# Patient Record
Sex: MALE | Race: WHITE | Employment: OTHER | URBAN - METROPOLITAN AREA
[De-identification: names, ages, dates, MRNs, and addresses within clinical notes are randomized per-mention and may not be internally consistent; named-entity substitution may affect disease eponyms.]

---

## 2018-09-20 ENCOUNTER — TELEPHONE (OUTPATIENT)
Dept: SURGERY | Facility: CLINIC | Age: 63
End: 2018-09-20

## 2018-09-20 ENCOUNTER — HOSPITAL ENCOUNTER (OUTPATIENT)
Dept: CT IMAGING | Facility: HOSPITAL | Age: 63
Discharge: HOME | End: 2018-09-20
Attending: FAMILY MEDICINE
Payer: COMMERCIAL

## 2018-09-20 DIAGNOSIS — I77.9 DISEASE OF ARTERY (HCC): ICD-10-CM

## 2018-09-20 DIAGNOSIS — R93.1 ABNORMAL ECHOCARDIOGRAM: ICD-10-CM

## 2018-09-20 DIAGNOSIS — R07.1 CHEST PAIN MADE WORSE BY BREATHING: ICD-10-CM

## 2018-09-20 DIAGNOSIS — R07.9 CHEST PAIN, UNSPECIFIED: ICD-10-CM

## 2018-09-20 PROCEDURE — 71275 CT ANGIOGRAPHY CHEST: CPT | Performed by: FAMILY MEDICINE

## 2018-09-20 PROCEDURE — 82565 ASSAY OF CREATININE: CPT

## 2018-09-20 NOTE — TELEPHONE ENCOUNTER
Pt scheduled NP appt 9/25 with Dr Judson Fu by Dr Steve Carpenter for back pain,NP paperwork/Referral endorsed to Pain JEB diazer

## 2018-09-25 ENCOUNTER — OFFICE VISIT (OUTPATIENT)
Dept: PAIN CLINIC | Facility: CLINIC | Age: 63
End: 2018-09-25
Payer: COMMERCIAL

## 2018-09-25 VITALS
OXYGEN SATURATION: 97 % | HEART RATE: 65 BPM | DIASTOLIC BLOOD PRESSURE: 84 MMHG | BODY MASS INDEX: 39.1 KG/M2 | WEIGHT: 295 LBS | SYSTOLIC BLOOD PRESSURE: 112 MMHG | HEIGHT: 73 IN

## 2018-09-25 DIAGNOSIS — M54.6 CHRONIC RIGHT-SIDED THORACIC BACK PAIN: ICD-10-CM

## 2018-09-25 DIAGNOSIS — G89.29 CHRONIC RIGHT-SIDED THORACIC BACK PAIN: ICD-10-CM

## 2018-09-25 DIAGNOSIS — M54.6 THORACIC SPINE PAIN: Primary | ICD-10-CM

## 2018-09-25 LAB — CREAT SERPL-MCNC: 0.6 MG/DL (ref 0.7–1.3)

## 2018-09-25 PROCEDURE — 99203 OFFICE O/P NEW LOW 30 MIN: CPT | Performed by: ANESTHESIOLOGY

## 2018-09-25 RX ORDER — IBUPROFEN 200 MG
600 TABLET ORAL EVERY 6 HOURS PRN
COMMUNITY
End: 2022-01-31

## 2018-09-25 RX ORDER — PHENTERMINE HYDROCHLORIDE 15 MG/1
15 CAPSULE ORAL DAILY
Refills: 1 | COMMUNITY
Start: 2018-09-11 | End: 2019-01-17

## 2018-09-25 RX ORDER — TOPIRAMATE 50 MG/1
50 TABLET, FILM COATED ORAL DAILY
Refills: 0 | COMMUNITY
Start: 2018-09-11 | End: 2020-09-16

## 2018-09-25 RX ORDER — FUROSEMIDE 20 MG/1
20 TABLET ORAL DAILY
Refills: 0 | COMMUNITY
Start: 2018-07-10

## 2018-09-25 RX ORDER — LEVOTHYROXINE SODIUM 0.15 MG/1
150 TABLET ORAL DAILY
Refills: 0 | COMMUNITY
Start: 2018-09-09

## 2018-09-25 NOTE — PATIENT INSTRUCTIONS
Refill policies:    • Allow 2-3 business days for refills; controlled substances may take longer.   • Contact your pharmacy at least 5 days prior to running out of medication and have them send an electronic request or submit request through the “request re entire amount billed. Precertification and Prior Authorizations: If your physician has recommended that you have a procedure or additional testing performed.   [facility]) will contact your insurance carrier to obtain pre-certi

## 2018-09-25 NOTE — H&P
Name: Yariel Gonzalez   : 1955   DOS: 2018     Chief complaint: Right-sided thoracic spine pain    History of present illness:  Yariel Gonzalez is a 60year old male complaining of a 4 month history of right-sided thoracic spine pain.   Jose Victoria 295 lb   SpO2 97%   BMI 38.92 kg/m²    The patient is awake, alert, oriented and corporative. She has a normal affect. The patient ambulates with normal gait. HEENT: No gross lesion noted. PEERL. No icterus. Neck and Upper Extremity: Supple.  No thyromega Management

## 2018-09-25 NOTE — PROGRESS NOTES
HPI    Review of Systems      Physical Exam   Constitutional:         IF#[id number]  NP here c/o Right sided mid back pain 2/10 current pain. Pain increases upon turning/twisting. With wife, ok to hear PHI.     Location of Pain: Right sided mid-back pain    Da

## 2018-10-11 ENCOUNTER — HOSPITAL ENCOUNTER (OUTPATIENT)
Dept: CV DIAGNOSTICS | Facility: HOSPITAL | Age: 63
Discharge: HOME | End: 2018-10-11
Attending: FAMILY MEDICINE
Payer: COMMERCIAL

## 2018-10-11 DIAGNOSIS — I71.2 ANEURYSM, AORTA, THORACIC (HCC): ICD-10-CM

## 2018-10-11 PROCEDURE — 93306 TTE W/DOPPLER COMPLETE: CPT | Performed by: FAMILY MEDICINE

## 2018-11-20 ENCOUNTER — TELEPHONE (OUTPATIENT)
Dept: SURGERY | Facility: CLINIC | Age: 63
End: 2018-11-20

## 2018-11-20 NOTE — TELEPHONE ENCOUNTER
rcvd MRI Thoracic 11/20/18 CD from Patient. They waited for it to be uploaded in PACS & took it home. [employer] imaging.  No report

## 2018-12-04 ENCOUNTER — OFFICE VISIT (OUTPATIENT)
Dept: PAIN CLINIC | Facility: CLINIC | Age: 63
End: 2018-12-04
Payer: COMMERCIAL

## 2018-12-04 VITALS
HEART RATE: 57 BPM | DIASTOLIC BLOOD PRESSURE: 80 MMHG | HEIGHT: 73 IN | OXYGEN SATURATION: 98 % | SYSTOLIC BLOOD PRESSURE: 128 MMHG | BODY MASS INDEX: 39.36 KG/M2 | WEIGHT: 297 LBS

## 2018-12-04 DIAGNOSIS — M54.6 CHRONIC RIGHT-SIDED THORACIC BACK PAIN: Primary | ICD-10-CM

## 2018-12-04 DIAGNOSIS — G89.29 CHRONIC RIGHT-SIDED THORACIC BACK PAIN: Primary | ICD-10-CM

## 2018-12-04 PROCEDURE — 99213 OFFICE O/P EST LOW 20 MIN: CPT | Performed by: ANESTHESIOLOGY

## 2018-12-04 NOTE — PROGRESS NOTES
Name: Felicia Quintana   : 1955   DOS: 2018     Pain Clinic Follow Up Visit:   Patient presents with: Follow - Up: right side/mid back.  2/10  Other: with alpesh Rocha to hear Kristy Flaco is a 60year old male with a chronic a ri the right scapular border. Full range of motion. IMAGES:   Cervical and thoracic MRI reviewed. He does have a known abdominal aortic aneurysm that is 4.1 cm.   He is followed with his cardiologist.  Additionally, from a musculoskeletal standpoint, th

## 2018-12-06 ENCOUNTER — TELEPHONE (OUTPATIENT)
Dept: PAIN CLINIC | Facility: CLINIC | Age: 63
End: 2018-12-06

## 2018-12-06 NOTE — TELEPHONE ENCOUNTER
Spoke to [place] at [address], [employer] ([id number]) is valid and billable, no copay applies to the service, covered at 60% of the allowed amount. No prior authorization or predetermination required.  Call reference #[id number], time on c

## 2018-12-07 ENCOUNTER — TELEPHONE (OUTPATIENT)
Dept: PAIN CLINIC | Facility: CLINIC | Age: 63
End: 2018-12-07

## 2018-12-07 ENCOUNTER — OFFICE VISIT (OUTPATIENT)
Dept: PAIN CLINIC | Facility: CLINIC | Age: 63
End: 2018-12-07
Payer: COMMERCIAL

## 2018-12-07 VITALS
DIASTOLIC BLOOD PRESSURE: 88 MMHG | WEIGHT: 297 LBS | OXYGEN SATURATION: 93 % | BODY MASS INDEX: 39.36 KG/M2 | HEART RATE: 70 BPM | HEIGHT: 73 IN | SYSTOLIC BLOOD PRESSURE: 122 MMHG

## 2018-12-07 DIAGNOSIS — G89.29 CHRONIC RIGHT-SIDED THORACIC BACK PAIN: Primary | ICD-10-CM

## 2018-12-07 DIAGNOSIS — M54.6 CHRONIC RIGHT-SIDED THORACIC BACK PAIN: Primary | ICD-10-CM

## 2018-12-07 PROCEDURE — 99211 OFF/OP EST MAY X REQ PHY/QHP: CPT | Performed by: ANESTHESIOLOGY

## 2018-12-07 NOTE — PROGRESS NOTES
Patient initially scheduled for right scapular border trigger point injections. During discussion prior to injection, the patient states that he does not have any scapular pain today. Therefore, decision was made to cancel injection.   He does have pain a

## 2018-12-07 NOTE — TELEPHONE ENCOUNTER
Medical clearance needed- no    Pt seen in OV today by Dr. Mia Lynch and recommended for Intercostal Nerve Block (X 1). Please begin PA process for procedure(s). Laterality: right  Level(s):  Intercostal Nerve Block    Pt informed callback will be given whe

## 2018-12-07 NOTE — TELEPHONE ENCOUNTER
Spoke with Asim from [address] [place] [address] Ave [phone] Intercostal Nerve Block PA needed   Faxed clinicals to [phone]  Pending case [id number]  Call time 1:00:47

## 2018-12-14 ENCOUNTER — TELEPHONE (OUTPATIENT)
Dept: SURGERY | Facility: CLINIC | Age: 63
End: 2018-12-14

## 2018-12-20 NOTE — TELEPHONE ENCOUNTER
Spoke to Manoj at [facility], codes [facility] are valid and billable, no prior authorization or predetermination required.    Call reference: [id number]  Call time 35:29    Patient can now be scheduled

## 2018-12-26 ENCOUNTER — APPOINTMENT (OUTPATIENT)
Dept: GENERAL RADIOLOGY | Facility: HOSPITAL | Age: 63
End: 2018-12-26
Attending: ANESTHESIOLOGY
Payer: COMMERCIAL

## 2018-12-26 ENCOUNTER — HOSPITAL ENCOUNTER (OUTPATIENT)
Facility: HOSPITAL | Age: 63
Setting detail: HOSPITAL OUTPATIENT SURGERY
Discharge: HOME | End: 2018-12-26
Attending: ANESTHESIOLOGY | Admitting: ANESTHESIOLOGY
Payer: COMMERCIAL

## 2018-12-26 VITALS
SYSTOLIC BLOOD PRESSURE: 132 MMHG | HEART RATE: 68 BPM | RESPIRATION RATE: 20 BRPM | DIASTOLIC BLOOD PRESSURE: 80 MMHG | TEMPERATURE: 97 F | OXYGEN SATURATION: 98 %

## 2018-12-26 DIAGNOSIS — G89.29 CHRONIC RIGHT-SIDED THORACIC BACK PAIN: ICD-10-CM

## 2018-12-26 DIAGNOSIS — M54.6 CHRONIC RIGHT-SIDED THORACIC BACK PAIN: ICD-10-CM

## 2018-12-26 PROCEDURE — 3E0T3BZ INTRODUCTION OF ANESTHETIC AGENT INTO PERIPHERAL NERVES AND PLEXI, PERCUTANEOUS APPROACH: ICD-10-PCS | Performed by: ANESTHESIOLOGY

## 2018-12-26 PROCEDURE — 3E0T33Z INTRODUCTION OF ANTI-INFLAMMATORY INTO PERIPHERAL NERVES AND PLEXI, PERCUTANEOUS APPROACH: ICD-10-PCS | Performed by: ANESTHESIOLOGY

## 2018-12-26 RX ORDER — LIDOCAINE HYDROCHLORIDE 10 MG/ML
INJECTION, SOLUTION INFILTRATION; PERINEURAL AS NEEDED
Status: DISCONTINUED | OUTPATIENT
Start: 2018-12-26 | End: 2018-12-26 | Stop reason: HOSPADM

## 2018-12-26 RX ORDER — ONDANSETRON 2 MG/ML
4 INJECTION INTRAMUSCULAR; INTRAVENOUS ONCE AS NEEDED
Status: DISCONTINUED | OUTPATIENT
Start: 2018-12-26 | End: 2018-12-26

## 2018-12-26 RX ORDER — ONDANSETRON 2 MG/ML
4 INJECTION INTRAMUSCULAR; INTRAVENOUS ONCE AS NEEDED
Status: DISCONTINUED | OUTPATIENT
Start: 2018-12-26 | End: 2018-12-26 | Stop reason: HOSPADM

## 2018-12-26 RX ORDER — METHYLPREDNISOLONE ACETATE 40 MG/ML
INJECTION, SUSPENSION INTRA-ARTICULAR; INTRALESIONAL; INTRAMUSCULAR; SOFT TISSUE AS NEEDED
Status: DISCONTINUED | OUTPATIENT
Start: 2018-12-26 | End: 2018-12-26 | Stop reason: HOSPADM

## 2018-12-26 RX ORDER — SODIUM CHLORIDE, SODIUM LACTATE, POTASSIUM CHLORIDE, CALCIUM CHLORIDE 600; 310; 30; 20 MG/100ML; MG/100ML; MG/100ML; MG/100ML
100 INJECTION, SOLUTION INTRAVENOUS CONTINUOUS
Status: DISCONTINUED | OUTPATIENT
Start: 2018-12-26 | End: 2018-12-26

## 2018-12-26 RX ORDER — DIPHENHYDRAMINE HYDROCHLORIDE 50 MG/ML
50 INJECTION INTRAMUSCULAR; INTRAVENOUS ONCE AS NEEDED
Status: DISCONTINUED | OUTPATIENT
Start: 2018-12-26 | End: 2018-12-26

## 2018-12-26 NOTE — H&P
History & Physical Examination    Patient Name: Cassidy Pedroza  MRN: [id number]  CSN: [id number]  YOB: 1955    Pre-Operative Diagnosis:  Chronic right-sided thoracic back pain [M54.6, G89.29]    Present Illness: Patient with right-sided th [x ]    UROGENITAL [x ] [x ]    EXTREMITIES [x ] [x ]    OTHER        [ x ] I have discussed the risks and benefits and alternatives with the patient/family. They understand and agree to proceed with plan of care.   [ x ] I have reviewed the History and Ph

## 2019-05-21 ENCOUNTER — OFFICE VISIT (OUTPATIENT)
Dept: PAIN CLINIC | Facility: CLINIC | Age: 64
End: 2019-05-21
Payer: COMMERCIAL

## 2019-05-21 ENCOUNTER — TELEPHONE (OUTPATIENT)
Dept: PAIN CLINIC | Facility: CLINIC | Age: 64
End: 2019-05-21

## 2019-05-21 VITALS
HEIGHT: 73 IN | SYSTOLIC BLOOD PRESSURE: 110 MMHG | WEIGHT: 298 LBS | BODY MASS INDEX: 39.49 KG/M2 | HEART RATE: 69 BPM | DIASTOLIC BLOOD PRESSURE: 80 MMHG | OXYGEN SATURATION: 98 %

## 2019-05-21 DIAGNOSIS — G89.29 CHRONIC RIGHT-SIDED THORACIC BACK PAIN: Primary | ICD-10-CM

## 2019-05-21 DIAGNOSIS — M54.6 CHRONIC RIGHT-SIDED THORACIC BACK PAIN: Primary | ICD-10-CM

## 2019-05-21 PROCEDURE — 99213 OFFICE O/P EST LOW 20 MIN: CPT | Performed by: ANESTHESIOLOGY

## 2019-05-21 RX ORDER — PHENTERMINE HYDROCHLORIDE 15 MG/1
CAPSULE ORAL
Refills: 0 | COMMUNITY
Start: 2019-05-10 | End: 2022-01-31

## 2019-05-21 RX ORDER — ROSUVASTATIN CALCIUM 10 MG/1
TABLET, COATED ORAL
Refills: 2 | COMMUNITY
Start: 2019-01-14

## 2019-05-21 NOTE — PROGRESS NOTES
Patient here c/o f/u post RIGHT INTERCOSTAL NERVE BLOCK, MULTIPLE LEVELS injection.     Pain location =     Pain Level = /10    Relief reported = %

## 2019-05-21 NOTE — PROGRESS NOTES
Name: Lolly Warner   : 1955   DOS: 2019     Pain Clinic Follow Up Visit:   Patient presents with: Follow - Up: post intercostal block- 95% pain relief for 1 month. Pain is now back.       Lolly Warner is a 58year old male with a hi patient is a very pleasant 26-year-old gentleman with a history of right-sided back and chest wall pain. He did very well following intercostal nerve blocks. I recommended repeating this injection for him.   He is in agreement and would like to move forwa

## 2019-05-21 NOTE — TELEPHONE ENCOUNTER
Medical clearance needed- no    **Patient reported 95% relief from previous injection that lasted 1 month, currently 50%**    Pt seen in OV today by Dr. Eve Seals and recommended for intercostal nerve block (X 1). Please begin PA process for procedure(s).

## 2019-05-21 NOTE — TELEPHONE ENCOUNTER
Spoke with Jay Jay Smith from [facility] no pa needed for [phone]   Call reference number [id number]  Call time 20:38

## 2019-05-22 NOTE — TELEPHONE ENCOUNTER
Patient called and scheduled procedure. Instructions below reviewed including NSAID hold.  Patient verbalized understanding    [address]  PRE-PROCEDURE INSTRUCTIONS WITHOUT SEDATION    Appointment Date: 6/3/19  Check-In Time: 9:30 am ? 81mg 24 hours  ? Greater than 81 mg (325mg) 7 days  ? Coumadin       5 days   · Procedure may be cancelled if INR is elevated. ? Excedrin (with aspirin) 7 days  ? Plavix (Clopidogrel)  ? Epidural ____ - 10 days  ? Others 7 days   NSAIDs: 24 hours    ?  I ** TO AVOID CANCELLATION AND/OR RESCHEDULING: PLEASE CALL [facility]AL PRE-PROCEDURE LINE [phone] FOR DETAILED INSTRUCTIONS FIVE TO SEVEN DAYS PRIOR TO PROCEDURE**

## 2019-05-22 NOTE — TELEPHONE ENCOUNTER
Patient called and was provided with next available date. He stated that he will need to check with his wife and will call back.

## 2019-05-29 ENCOUNTER — TELEPHONE (OUTPATIENT)
Dept: SURGERY | Facility: CLINIC | Age: 64
End: 2019-05-29

## 2019-06-03 ENCOUNTER — HOSPITAL ENCOUNTER (OUTPATIENT)
Facility: HOSPITAL | Age: 64
Setting detail: HOSPITAL OUTPATIENT SURGERY
Discharge: HOME | End: 2019-06-03
Attending: ANESTHESIOLOGY | Admitting: ANESTHESIOLOGY
Payer: COMMERCIAL

## 2019-06-03 ENCOUNTER — APPOINTMENT (OUTPATIENT)
Dept: GENERAL RADIOLOGY | Facility: HOSPITAL | Age: 64
End: 2019-06-03
Attending: ANESTHESIOLOGY
Payer: COMMERCIAL

## 2019-06-03 VITALS
HEART RATE: 65 BPM | TEMPERATURE: 98 F | OXYGEN SATURATION: 95 % | SYSTOLIC BLOOD PRESSURE: 106 MMHG | DIASTOLIC BLOOD PRESSURE: 56 MMHG | RESPIRATION RATE: 18 BRPM

## 2019-06-03 DIAGNOSIS — G89.29 CHRONIC RIGHT-SIDED THORACIC BACK PAIN: ICD-10-CM

## 2019-06-03 DIAGNOSIS — M54.6 CHRONIC RIGHT-SIDED THORACIC BACK PAIN: ICD-10-CM

## 2019-06-03 PROCEDURE — 3E0T33Z INTRODUCTION OF ANTI-INFLAMMATORY INTO PERIPHERAL NERVES AND PLEXI, PERCUTANEOUS APPROACH: ICD-10-PCS | Performed by: ANESTHESIOLOGY

## 2019-06-03 RX ORDER — LIDOCAINE HYDROCHLORIDE 10 MG/ML
INJECTION, SOLUTION INFILTRATION; PERINEURAL AS NEEDED
Status: DISCONTINUED | OUTPATIENT
Start: 2019-06-03 | End: 2019-06-03

## 2019-06-03 RX ORDER — BUPIVACAINE HYDROCHLORIDE 5 MG/ML
INJECTION, SOLUTION EPIDURAL; INTRACAUDAL AS NEEDED
Status: DISCONTINUED | OUTPATIENT
Start: 2019-06-03 | End: 2019-06-03

## 2019-06-03 RX ORDER — METHYLPREDNISOLONE ACETATE 40 MG/ML
INJECTION, SUSPENSION INTRA-ARTICULAR; INTRALESIONAL; INTRAMUSCULAR; SOFT TISSUE AS NEEDED
Status: DISCONTINUED | OUTPATIENT
Start: 2019-06-03 | End: 2019-06-03

## 2019-06-03 RX ORDER — ONDANSETRON 2 MG/ML
4 INJECTION INTRAMUSCULAR; INTRAVENOUS ONCE AS NEEDED
Status: DISCONTINUED | OUTPATIENT
Start: 2019-06-03 | End: 2019-06-03

## 2019-06-03 RX ORDER — DIPHENHYDRAMINE HYDROCHLORIDE 50 MG/ML
50 INJECTION INTRAMUSCULAR; INTRAVENOUS ONCE AS NEEDED
Status: DISCONTINUED | OUTPATIENT
Start: 2019-06-03 | End: 2019-06-03

## 2019-06-03 NOTE — H&P
History & Physical Examination    Patient Name: Luke Mendez  MRN: [id number]  CSN: [id number]  YOB: 1955    Pre-Operative Diagnosis:  Chronic right-sided thoracic back pain [M54.6, G89.29]    Present Illness: Patient with right-sided th days.  Any changes noted above.     Юлия Alicia MD

## 2019-06-03 NOTE — OPERATIVE REPORT
[facility]  Operative Report  6/3/2019     Jhony Ramos Patient Status:  Hospital Outpatient Surgery    1955 MRN [id number]   [facility] ENDOSCOPY Attending Kwabena Moser MD   Hosp Day # 0 PCP Alo Henson MD     Indicat 0.5 mL lidocaine. The patient tolerated procedure very well. The patient was observed until discharge criteria met. Discharge instructions were given and patient was released to a responsible adult. Complications: None. Follow up:  The patient was

## 2019-12-03 ENCOUNTER — TELEPHONE (OUTPATIENT)
Dept: PAIN CLINIC | Facility: CLINIC | Age: 64
End: 2019-12-03

## 2019-12-03 DIAGNOSIS — G58.8 INTERCOSTAL NEURALGIA: Primary | ICD-10-CM

## 2019-12-03 NOTE — TELEPHONE ENCOUNTER
Pt want s to schedule inj. We advised we haven't seen him in 6mth & to celeste a f/u. Pt states does not want f/u & just inj.  Please call

## 2019-12-04 NOTE — TELEPHONE ENCOUNTER
Spoke to pt to advise that injections would need to be PA'd through ins and OV notes are required. Pt advised that he has recently switched to Medicare Part B and he thought this plan covered all procedures.  Advised pt that Part B does typically cover svcs

## 2019-12-05 NOTE — TELEPHONE ENCOUNTER
Regardless go insurance patient was last seen in office in May of 2019 and we need to re-evaluate and put a plan in place for injection. Please check with Dr. Sandro Gonsalves next week if he wants to schedule an office visit and injection on same day.

## 2019-12-10 NOTE — TELEPHONE ENCOUNTER
Spoke to pt to schedule OV w/ Dr Tamela Alvarenga. Pt states he began Medicare Part B on 12/1/19 and asked if deductibles start over. Advised pt it is my understanding that they do. Scheduled for 12/17/19. No further needs.

## 2019-12-17 ENCOUNTER — OFFICE VISIT (OUTPATIENT)
Dept: PAIN CLINIC | Facility: CLINIC | Age: 64
End: 2019-12-17
Payer: MEDICARE

## 2019-12-17 VITALS
SYSTOLIC BLOOD PRESSURE: 112 MMHG | HEART RATE: 66 BPM | BODY MASS INDEX: 40.42 KG/M2 | DIASTOLIC BLOOD PRESSURE: 72 MMHG | WEIGHT: 305 LBS | OXYGEN SATURATION: 94 % | HEIGHT: 73 IN

## 2019-12-17 DIAGNOSIS — G89.29 CHRONIC RIGHT-SIDED THORACIC BACK PAIN: Primary | ICD-10-CM

## 2019-12-17 DIAGNOSIS — M54.6 CHRONIC RIGHT-SIDED THORACIC BACK PAIN: Primary | ICD-10-CM

## 2019-12-17 PROCEDURE — 99213 OFFICE O/P EST LOW 20 MIN: CPT | Performed by: ANESTHESIOLOGY

## 2019-12-17 RX ORDER — DEXMETHYLPHENIDATE HYDROCHLORIDE 5 MG/1
TABLET ORAL
Refills: 0 | COMMUNITY
Start: 2019-09-19 | End: 2020-02-05 | Stop reason: ALTCHOICE

## 2019-12-17 RX ORDER — CEPHALEXIN 500 MG/1
CAPSULE ORAL
Refills: 0 | COMMUNITY
Start: 2019-10-24 | End: 2020-02-05 | Stop reason: ALTCHOICE

## 2019-12-17 NOTE — PROGRESS NOTES
Patient presents in office today with reported pain in right back, radiating across the entire back. Patient states he has been going to yoga however it seems to make it worse. Current pain level reported = 0/10 (9-10/10 when at its worst).     Last proc

## 2019-12-17 NOTE — TELEPHONE ENCOUNTER
Patient requesting to repeat Intercostal NB with local sedation, requesting to have procedure in January. Procedure scheduled, pre-procedure instructions reviewed. Patient prefers local sedation.  Patient advised to hold ibuprofen for 24 hours prior to proc ? Imbruvica (Ibrutinib) 3 days   ? Enbrel (Etanercept) 24 hours   ? Fragmin (Dalteparin) 24 hours   ? Pletal (Cilostazol) 7 days  ? Effient (Prasugrel) 7 days  ? Pradaxa 10 days  ? Trental 7 days  ? Eliquis (Apixaban) 3 days  ?  Xarelto (Rivaroxaban) 3 days If you are a diabetic, please increase the frequency of your glucose monitoring after the procedure as this may cause a temporary increase in your blood sugar.   Contact your primary care physician if your blood sugar rises as you may require some medicatio

## 2019-12-17 NOTE — PROGRESS NOTES
Name: Jm Toussaint   : 1955   DOS: 2019     Pain Clinic Follow Up Visit:   Patient presents with: Follow - Up      Jm Toussaint is a 58year old male with a history of right-sided intercostal nerve pain, here for follow-up.   The p imaging from procedure reviewed    ASSESSMENT AND PLAN:   Chronic right-sided thoracic back pain  (primary encounter diagnosis)     The patient is a 76-year-old gentleman with right-sided intercostal nerve pain here for follow-up.   He is doing very well fo

## 2019-12-30 ENCOUNTER — TELEPHONE (OUTPATIENT)
Dept: PAIN CLINIC | Facility: CLINIC | Age: 64
End: 2019-12-30

## 2019-12-30 NOTE — TELEPHONE ENCOUNTER
Left message for patient, confirmed procedure date of 1/6/20 and to be checked in at outpatient registration at 1045 am. Patient called pre-procedure line and understood instructions/Patient instructed to call pre-procedure line before procedure at [phone]

## 2020-01-06 ENCOUNTER — APPOINTMENT (OUTPATIENT)
Dept: GENERAL RADIOLOGY | Facility: HOSPITAL | Age: 65
End: 2020-01-06
Attending: ANESTHESIOLOGY
Payer: MEDICARE

## 2020-01-06 ENCOUNTER — HOSPITAL ENCOUNTER (OUTPATIENT)
Facility: HOSPITAL | Age: 65
Setting detail: HOSPITAL OUTPATIENT SURGERY
Discharge: HOME | End: 2020-01-06
Attending: ANESTHESIOLOGY | Admitting: ANESTHESIOLOGY
Payer: MEDICARE

## 2020-01-06 VITALS
OXYGEN SATURATION: 97 % | RESPIRATION RATE: 18 BRPM | TEMPERATURE: 98 F | HEART RATE: 50 BPM | SYSTOLIC BLOOD PRESSURE: 119 MMHG | DIASTOLIC BLOOD PRESSURE: 74 MMHG

## 2020-01-06 DIAGNOSIS — G58.8 INTERCOSTAL NEURALGIA: ICD-10-CM

## 2020-01-06 PROCEDURE — 3E0T3BZ INTRODUCTION OF ANESTHETIC AGENT INTO PERIPHERAL NERVES AND PLEXI, PERCUTANEOUS APPROACH: ICD-10-PCS | Performed by: ANESTHESIOLOGY

## 2020-01-06 PROCEDURE — 3E0T33Z INTRODUCTION OF ANTI-INFLAMMATORY INTO PERIPHERAL NERVES AND PLEXI, PERCUTANEOUS APPROACH: ICD-10-PCS | Performed by: ANESTHESIOLOGY

## 2020-01-06 RX ORDER — BUPIVACAINE HYDROCHLORIDE 5 MG/ML
INJECTION, SOLUTION EPIDURAL; INTRACAUDAL AS NEEDED
Status: DISCONTINUED | OUTPATIENT
Start: 2020-01-06 | End: 2020-01-06

## 2020-01-06 RX ORDER — ONDANSETRON 2 MG/ML
4 INJECTION INTRAMUSCULAR; INTRAVENOUS ONCE AS NEEDED
Status: CANCELLED | OUTPATIENT
Start: 2020-01-06 | End: 2020-01-06

## 2020-01-06 RX ORDER — LIDOCAINE HYDROCHLORIDE 10 MG/ML
INJECTION, SOLUTION INFILTRATION; PERINEURAL AS NEEDED
Status: DISCONTINUED | OUTPATIENT
Start: 2020-01-06 | End: 2020-01-06

## 2020-01-06 RX ORDER — METHYLPREDNISOLONE ACETATE 40 MG/ML
INJECTION, SUSPENSION INTRA-ARTICULAR; INTRALESIONAL; INTRAMUSCULAR; SOFT TISSUE AS NEEDED
Status: DISCONTINUED | OUTPATIENT
Start: 2020-01-06 | End: 2020-01-06

## 2020-01-06 RX ORDER — DIPHENHYDRAMINE HYDROCHLORIDE 50 MG/ML
50 INJECTION INTRAMUSCULAR; INTRAVENOUS ONCE AS NEEDED
Status: CANCELLED | OUTPATIENT
Start: 2020-01-06 | End: 2020-01-06

## 2020-01-06 NOTE — H&P
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 They understand and agree to proceed with plan of care. [ x ] I have reviewed the History and Physical done within the last 30 days. Any changes noted above.     Luciana Cabezas MD

## 2020-01-06 NOTE — OPERATIVE REPORT
[facility]  Operative Report  2020     [facility] Patient Status:  Hospital Outpatient Surgery    1955 MRN [id number]   [facility] ENDOSCOPY Attending Jacques Chang MD   Hosp Day # 0 PCP Cely Flores MD     Indicat T6, T7, and T8 vertebral body levels. The patient tolerated procedure very well. The patient was observed until discharge criteria met. Discharge instructions were given and patient was released to a responsible adult. Complications: None.     Halina Knox

## 2020-01-22 ENCOUNTER — HOSPITAL ENCOUNTER (OUTPATIENT)
Dept: CV DIAGNOSTICS | Facility: HOSPITAL | Age: 65
Discharge: HOME | End: 2020-01-22
Attending: FAMILY MEDICINE
Payer: MEDICARE

## 2020-01-22 ENCOUNTER — HOSPITAL ENCOUNTER (OUTPATIENT)
Dept: ULTRASOUND IMAGING | Facility: HOSPITAL | Age: 65
Discharge: HOME | End: 2020-01-22
Attending: FAMILY MEDICINE
Payer: MEDICARE

## 2020-01-22 DIAGNOSIS — Z80.51 FAMILY HISTORY OF MALIGNANT NEOPLASM OF KIDNEY: ICD-10-CM

## 2020-01-22 DIAGNOSIS — E66.01 MORBID OBESITY (HCC): ICD-10-CM

## 2020-01-22 DIAGNOSIS — I71.2 THORACIC AORTIC ANEURYSM (TAA) (HCC): ICD-10-CM

## 2020-01-22 DIAGNOSIS — G47.33 OBSTRUCTIVE SLEEP APNEA: ICD-10-CM

## 2020-01-22 DIAGNOSIS — D41.02 NEOPLASM OF UNCERTAIN BEHAVIOR OF KIDNEY, LEFT: ICD-10-CM

## 2020-01-22 PROCEDURE — 76775 US EXAM ABDO BACK WALL LIM: CPT | Performed by: FAMILY MEDICINE

## 2020-01-22 PROCEDURE — 93306 TTE W/DOPPLER COMPLETE: CPT | Performed by: FAMILY MEDICINE

## 2020-02-22 ENCOUNTER — OFFICE VISIT (OUTPATIENT)
Dept: SLEEP CENTER | Age: 65
End: 2020-02-22
Attending: INTERNAL MEDICINE
Payer: MEDICARE

## 2020-02-22 DIAGNOSIS — G47.33 OSA (OBSTRUCTIVE SLEEP APNEA): ICD-10-CM

## 2020-02-22 DIAGNOSIS — E66.01 MORBID OBESITY WITH BMI OF 40.0-44.9, ADULT (HCC): ICD-10-CM

## 2020-02-22 PROCEDURE — 95811 POLYSOM 6/>YRS CPAP 4/> PARM: CPT

## 2020-02-25 NOTE — PROCEDURES
[address] [address]       Accredited by the [facility] ([facility])    PATIENT'S NAME:        Rabia [facility] PHYSICIAN:   Kim Ross M.D.   REFERRING PHYSICIAN:   Kim Ross M.D. of 55% which is decreased as compared to expected norms. Sleep onset latency was increased at 14.4 minutes. Wake after sleep onset was increased at 40 minutes. Sleep was fragmented by repetitive arousals due to respiratory events.   The amount of stage 1 This study, along with the clinical history, is consistent with severe obstructive sleep apnea-hypopnea syndrome with significant oxyhemoglobin desaturations.   Nasal CPAP titration was performed, and the patient's sleep-disordered breathing was effectively

## 2020-07-21 ENCOUNTER — OFFICE VISIT (OUTPATIENT)
Dept: SURGERY | Facility: CLINIC | Age: 65
End: 2020-07-21
Payer: MEDICARE

## 2020-07-21 ENCOUNTER — LAB ENCOUNTER (OUTPATIENT)
Dept: LAB | Age: 65
End: 2020-07-21
Attending: UROLOGY
Payer: MEDICARE

## 2020-07-21 VITALS — HEART RATE: 66 BPM | DIASTOLIC BLOOD PRESSURE: 78 MMHG | SYSTOLIC BLOOD PRESSURE: 118 MMHG

## 2020-07-21 DIAGNOSIS — R97.20 ELEVATED PSA: ICD-10-CM

## 2020-07-21 DIAGNOSIS — R97.20 ELEVATED PSA: Primary | ICD-10-CM

## 2020-07-21 DIAGNOSIS — R39.9 LOWER URINARY TRACT SYMPTOMS: ICD-10-CM

## 2020-07-21 DIAGNOSIS — R82.90 URINE FINDING: Primary | ICD-10-CM

## 2020-07-21 LAB
APPEARANCE: CLEAR
MULTISTIX LOT#: 1044 NUMERIC
PH, URINE: 7 (ref 4.5–8)
SPECIFIC GRAVITY: 1.02 (ref 1–1.03)
URINE-COLOR: YELLOW
UROBILINOGEN,SEMI-QN: 0.2 MG/DL (ref 0–1.9)

## 2020-07-21 PROCEDURE — 99203 OFFICE O/P NEW LOW 30 MIN: CPT | Performed by: UROLOGY

## 2020-07-21 PROCEDURE — 36415 COLL VENOUS BLD VENIPUNCTURE: CPT

## 2020-07-21 PROCEDURE — 81003 URINALYSIS AUTO W/O SCOPE: CPT | Performed by: UROLOGY

## 2020-07-21 NOTE — PROGRESS NOTES
Rooming Clinician:     Leta Whitfield is a 71year old male. No chief complaint on file. HPI:     Patient comes to the office with history of an elevated PSA now up to 5.0 with free PSA of 10%.   It has steadily been increasing over the last omi Smoker      Smokeless tobacco: Never Used    Alcohol use:  Yes      Alcohol/week: 7.0 standard drinks      Types: 7 Standard drinks or equivalent per week      Comment: social    Drug use: No       REVIEW OF SYSTEMS:     GENERAL HEALTH: feels well otherwise biopsy of the prostate, 3 bela MRI, fusion prostate biopsy, cognitive biopsy based on MRI findings as well as steriotactic biopsy as well as  histologic tissue testing on previous biopsy tissue.   We talked about the risks and complications of these proced

## 2020-07-29 ENCOUNTER — TELEPHONE (OUTPATIENT)
Dept: SURGERY | Facility: CLINIC | Age: 65
End: 2020-07-29

## 2020-07-29 NOTE — TELEPHONE ENCOUNTER
RN called the [address] [place] [address] for the results on 7/21/20 7446. Spoke to Kalie. She said it was missing a collection date /time. She also requested to fix the name of the patient Jose Vyas). RN asked to fax it to [phone]. RN called and updated patient.  RN milind

## 2020-08-03 NOTE — TELEPHONE ENCOUNTER
RN called [facility] to follow up lab draw on 7/21 6228. Spoke to Braden and she said that it was missing a \"PRINTED NAME. \" RN to refax.

## 2020-08-04 NOTE — TELEPHONE ENCOUNTER
RN called Tani at [facility] to follow up with the fax information sent and the result. Per Tani, they will fax the result today.

## 2020-08-05 NOTE — TELEPHONE ENCOUNTER
I spoke to patient over the phone. 4K is elevated at 44%. We are still waiting for prostate MRI for this patient. Once that is done and report is back we can discuss options with patient. Dr Lissette Luciano is out for two weeks.  He would prefer I call him back wit

## 2020-08-06 ENCOUNTER — HOSPITAL ENCOUNTER (OUTPATIENT)
Dept: MRI IMAGING | Facility: HOSPITAL | Age: 65
Discharge: HOME | End: 2020-08-06
Attending: UROLOGY
Payer: MEDICARE

## 2020-08-06 DIAGNOSIS — R97.20 ELEVATED PSA: ICD-10-CM

## 2020-08-06 PROCEDURE — 72197 MRI PELVIS W/O & W/DYE: CPT | Performed by: UROLOGY

## 2020-08-06 PROCEDURE — A9575 INJ GADOTERATE MEGLUMI 0.1ML: HCPCS | Performed by: UROLOGY

## 2020-08-07 ENCOUNTER — TELEPHONE (OUTPATIENT)
Dept: SURGERY | Facility: CLINIC | Age: 65
End: 2020-08-07

## 2020-08-07 NOTE — TELEPHONE ENCOUNTER
RN relayed MD's message and recommendations.  See below:       Patricia Flowers MD  8/7/2020  8:54 AM      Your recent prostate MRI is abnormal. Within the posterior midline peripheral gland there is a well-defined region of low T2 signal especially at t

## 2020-08-10 ENCOUNTER — TELEPHONE (OUTPATIENT)
Dept: SURGERY | Facility: CLINIC | Age: 65
End: 2020-08-10

## 2020-08-10 DIAGNOSIS — R97.20 ELEVATED PSA: Primary | ICD-10-CM

## 2020-08-10 NOTE — TELEPHONE ENCOUNTER
RN called patient in response to his request for a biopsy schedule. Informed patient that currently, biopsy probe not available. RN to call him back to set up the schedule. Patient agreeable to plans.

## 2020-08-12 NOTE — TELEPHONE ENCOUNTER
RN called patient and left message that prostate probe is now available. RN to set up appt for prostate biopsy with Dr Aden Madrigal. Left message. Awaiting call back.

## 2020-08-13 NOTE — TELEPHONE ENCOUNTER
RN called patient and left message that prostate probe is now available. RN to set up appt for prostate biopsy with Dr Dylan Yeh. Left message. Awaiting call back.

## 2020-08-14 NOTE — TELEPHONE ENCOUNTER
RN spoke to patient. Informed that his the fusion biopsy of the prostate will be set up by the Surgery [facility]. RN will forward this request to her. Patient agreeable to plans.      See Dr Barbra Tang note below:  Girish Munoz MD  8/7/2020  8:5

## 2020-08-18 RX ORDER — DIAZEPAM 10 MG/1
TABLET ORAL
Qty: 1 TABLET | Refills: 0 | Status: SHIPPED | OUTPATIENT
Start: 2020-08-18 | End: 2022-01-31

## 2020-08-18 RX ORDER — CIPROFLOXACIN 500 MG/1
500 TABLET, FILM COATED ORAL 2 TIMES DAILY
Qty: 6 TABLET | Refills: 0 | Status: SHIPPED | OUTPATIENT
Start: 2020-08-18 | End: 2020-08-21

## 2020-08-18 RX ORDER — CEFDINIR 300 MG/1
300 CAPSULE ORAL EVERY 12 HOURS
Qty: 6 CAPSULE | Refills: 0 | Status: SHIPPED | OUTPATIENT
Start: 2020-08-18 | End: 2020-08-21

## 2020-08-18 NOTE — TELEPHONE ENCOUNTER
Spoke with patient and fusion biopsy scheduled for 9/16/20 at [facility]. Pre op instructions reviewed. Verbalized understanding. Patient requesting valium to take prior to procedure. Informed him he will need a  if he takes valium.  Verbal

## 2020-08-20 ENCOUNTER — TELEPHONE (OUTPATIENT)
Dept: SURGERY | Facility: CLINIC | Age: 65
End: 2020-08-20

## 2020-08-20 DIAGNOSIS — R97.20 ELEVATED PSA: Primary | ICD-10-CM

## 2020-08-20 RX ORDER — GENTAMICIN SULFATE 40 MG/ML
80 INJECTION, SOLUTION INTRAMUSCULAR; INTRAVENOUS ONCE
Status: DISCONTINUED | OUTPATIENT
Start: 2020-09-16 | End: 2020-09-16

## 2020-08-21 ENCOUNTER — PATIENT MESSAGE (OUTPATIENT)
Dept: SURGERY | Facility: CLINIC | Age: 65
End: 2020-08-21

## 2020-08-24 NOTE — TELEPHONE ENCOUNTER
From: Jm Toussaint  To: Gabby Calzada MD  Sent: 8/21/2020 2:51 PM CDT  Subject: Other    Will I have to take a Covid test before the Prostate biopsy scheduled for Sept 16th at 11 am?  If so, when and where will that be administered and how soon wi

## 2020-09-01 ENCOUNTER — TELEPHONE (OUTPATIENT)
Dept: SURGERY | Facility: CLINIC | Age: 65
End: 2020-09-01

## 2020-09-01 NOTE — TELEPHONE ENCOUNTER
RN received a call from Mona Sauer requesting for ICD code for this patient. ICD code R97.20 Elevated PSA. Has of elevated PSA.

## 2020-09-16 ENCOUNTER — HOSPITAL ENCOUNTER (OUTPATIENT)
Dept: ULTRASOUND IMAGING | Facility: HOSPITAL | Age: 65
Discharge: HOME | End: 2020-09-16
Attending: UROLOGY
Payer: MEDICARE

## 2020-09-16 VITALS — BODY MASS INDEX: 41.99 KG/M2 | WEIGHT: 310 LBS | HEIGHT: 72 IN | RESPIRATION RATE: 17 BRPM

## 2020-09-16 DIAGNOSIS — R97.20 ELEVATED PSA: Primary | ICD-10-CM

## 2020-09-16 PROCEDURE — 55700 BIOPSY OF PROSTATE,NEEDLE/PUNCH: CPT | Performed by: UROLOGY

## 2020-09-16 PROCEDURE — 76942 ECHO GUIDE FOR BIOPSY: CPT | Performed by: UROLOGY

## 2020-09-16 RX ORDER — GENTAMICIN SULFATE 40 MG/ML
150 INJECTION, SOLUTION INTRAMUSCULAR; INTRAVENOUS ONCE
Status: COMPLETED | OUTPATIENT
Start: 2020-09-16 | End: 2020-09-16

## 2020-09-16 RX ORDER — GENTAMICIN 10 MG/ML
80 INJECTION, SOLUTION INTRAMUSCULAR; INTRAVENOUS ONCE
Status: CANCELLED | OUTPATIENT
Start: 2020-09-16

## 2020-09-16 RX ORDER — GENTAMICIN SULFATE 40 MG/ML
1.5 INJECTION, SOLUTION INTRAMUSCULAR; INTRAVENOUS ONCE
Status: DISCONTINUED | OUTPATIENT
Start: 2020-09-16 | End: 2020-09-16

## 2020-09-16 RX ADMIN — GENTAMICIN SULFATE 150 MG: 40 INJECTION, SOLUTION INTRAMUSCULAR; INTRAVENOUS at 11:36:00

## 2020-09-16 NOTE — IMAGING NOTE
1033 PT ARRIVED TO RADIOLOGY HOLDING AREA FOR US GUIDED PROSTATE BIOPSY WITH DR CUCO HERNÁNDEZ REVIEWED PT DENIES TAKING ANY NSAID'S, IBUPROFEN , ASA, ALEVE, FISH OIL, VIT E OR HERBAL SUPPLEMENTS HX OBTAINED     1040 /83  HR 67     1043 PROCEDURE AN

## 2020-09-16 NOTE — PROGRESS NOTES
The patient was brought into the ultrasound suite in the radiology department at the hospital.  He was placed on a gurney in the left lateral decubitus position. Standard universal intraoperative timeout was taken.   Then transrectal ultrasonography

## 2020-10-02 ENCOUNTER — OFFICE VISIT (OUTPATIENT)
Dept: SURGERY | Facility: CLINIC | Age: 65
End: 2020-10-02
Payer: MEDICARE

## 2020-10-02 VITALS — SYSTOLIC BLOOD PRESSURE: 118 MMHG | HEART RATE: 67 BPM | TEMPERATURE: 97 F | DIASTOLIC BLOOD PRESSURE: 75 MMHG

## 2020-10-02 DIAGNOSIS — C61 PROSTATE CANCER (HCC): Primary | ICD-10-CM

## 2020-10-02 PROCEDURE — 99214 OFFICE O/P EST MOD 30 MIN: CPT | Performed by: UROLOGY

## 2020-10-02 NOTE — PROGRESS NOTES
Rooming Clinician:     Ariel David is a 71year old male.   Patient presents with:  Surgical Followup: s/p prostate bx Here to discuss the results        HPI:     Patient comes to the office with his spouse to discuss results of recent prostate needle Smokeless tobacco: Never Used    Alcohol use:  Yes      Alcohol/week: 7.0 standard drinks      Types: 7 Standard drinks or equivalent per week      Comment: social    Drug use: No       REVIEW OF SYSTEMS:     GENERAL HEALTH: feels well otherwise  SKIN: saige greatest linear dimension. · Perineural invasion is identified. · See comment.     B.  Prostate, right base x2; core biopsy:  · Prostatic adenocarcinoma, Clair Grade 6 (3+3), Grade Group 1, involving 2/2 cores of prostate tissue, approximately 75% of valentine high B diffusion weighted imaging. There is early enhancement within this region on dynamic post gadolinium imaging. This area would be considered a PI-RADS 4. The lesion measures   approximately 15 x 5 mm in size.      The transitional zone demonstrates orders for this visit:    [facility]      I spent 50 minutes with the patient and or spouse or family reviewing the patient's condition, including laboratory data, imaging, or pathology reports,  formulating a plan and making recommendations spe

## 2020-10-09 ENCOUNTER — OFFICE VISIT (OUTPATIENT)
Dept: RADIATION ONCOLOGY | Facility: HOSPITAL | Age: 65
End: 2020-10-09
Attending: RADIOLOGY
Payer: MEDICARE

## 2020-10-09 VITALS
TEMPERATURE: 98 F | RESPIRATION RATE: 18 BRPM | BODY MASS INDEX: 42.66 KG/M2 | DIASTOLIC BLOOD PRESSURE: 74 MMHG | SYSTOLIC BLOOD PRESSURE: 130 MMHG | WEIGHT: 315 LBS | HEART RATE: 73 BPM | HEIGHT: 72 IN

## 2020-10-09 DIAGNOSIS — C61 PROSTATE CANCER (HCC): Primary | ICD-10-CM

## 2020-10-09 PROCEDURE — 99212 OFFICE O/P EST SF 10 MIN: CPT

## 2020-10-09 NOTE — PROGRESS NOTES
Nursing Consultation Note  Patient: Felicia Quintana  YOB: 1955  Age: 71year old  Radiation Oncologist: Dr. Shanika Huddleston  Referring Physician: Daniella Addison  Diagnosis:No diagnosis found.   Consult Date: 10/9/2020      Chemotherap MG Oral Tab Take 20 mg by mouth daily. 0   • Levothyroxine Sodium 150 MCG Oral Tab Take 150 mcg by mouth daily.   0       Preferred Pharmacy:    [facility] [address] [facility],  [address], [phone], [phone] social      Drug use: No      Sexual activity: Not on file    Lifestyle      Physical activity        Days per week: Not on file        Minutes per session: Not on file      Stress: Not on file    Relationships      Social connections        Talks on phone very pleasant and conversant. Lives in [place] and will have his RT @ [facility]. Pt has questions regarding re-biopsy and proton therapy. Updated MD. Discussed the radiation process and need for CT simulation prior to RT. Discussed EBRT and CK.  Pt is not leani

## 2020-10-12 ENCOUNTER — TELEPHONE (OUTPATIENT)
Dept: SURGERY | Facility: CLINIC | Age: 65
End: 2020-10-12

## 2020-10-12 NOTE — TELEPHONE ENCOUNTER
RN received fax information from Intake Department of [address] [place] [address]. All requests of office notes,pathology and imaging faxed to [phone] today, 10/12/20.

## 2020-10-13 NOTE — CONSULTS
[facility]    PATIENT'S NAME: Alivia Burgos   RADIATION ONCOLOGIST: Leonel Chiu.  Lynette Morton MD   PATIENT ACCOUNT #: [de-identified] LOCATION: [address] [place] [address] RECORD #: [id number] YOB: 1955   CONSULTATION DATE: 10/09/2020       KELL involved. Based upon this finding, Dr. Penelope Beltre discussed the various options for the patient and also ordered a bone scan and a CT scan of the abdomen and pelvis. These have not yet been performed.   The patient was then referred to Radiation Oncology to lesions. NECK:  Supple, with no lymphadenopathy. LUNGS:  Clear to auscultation bilaterally. HEART:  Regular rate and rhythm, with normal S1 and S2, and no audible murmurs. LYMPHATICS:  There is no supraclavicular, axillary, or inguinal lymphadenopathy. [employer], but given his grouping, I would be less enthusiastic about this as there is some risk for extracapsular extension or other occult disease particularly in light of his very high volume disease and perineural invasion.   The patient did ask about If there should be any questions regarding the radiotherapy, please feel free to contact me at any time. Dictated By Eliazar Michelle MD  d: 10/13/2020 14:31:18  t: 10/13/2020 15:39:49  Job [id number]  NAD/    cc: Rick Gaitan MD

## 2020-10-19 ENCOUNTER — TELEPHONE (OUTPATIENT)
Dept: SURGERY | Facility: CLINIC | Age: 65
End: 2020-10-19

## 2020-10-19 NOTE — TELEPHONE ENCOUNTER
Regarding: RE: Visit Follow-up Question  Contact: [phone]  ----- Message from Nirmala Irwin LPN sent at 16/23/1122 11:12 AM CDT -----       ----- Message sent from Hang Turcios LPN to Radha He at 10/13/2020 11:12 AM -----   Good morning Mr Kathrine Carrel

## 2020-10-19 NOTE — TELEPHONE ENCOUNTER
Extensive discussion with patient regarding treatment. He has been seen here at [facility] by radiation oncology and subsequently at [facility] for proton therapy and is leaning towards initiation of proton therapy.   Patient will have genomic testing done to determi

## 2020-10-20 ENCOUNTER — LAB REQUISITION (OUTPATIENT)
Dept: LAB | Facility: HOSPITAL | Age: 65
End: 2020-10-20
Payer: MEDICARE

## 2020-10-20 ENCOUNTER — TELEPHONE (OUTPATIENT)
Dept: SURGERY | Facility: CLINIC | Age: 65
End: 2020-10-20

## 2020-10-20 DIAGNOSIS — C61 MALIGNANT NEOPLASM OF PROSTATE (HCC): ICD-10-CM

## 2020-10-20 PROCEDURE — 88363 XM ARCHIVE TISSUE MOLEC ANAL: CPT

## 2020-10-20 NOTE — TELEPHONE ENCOUNTER
RN received fax information from [address] for Radiation Onco Consultation note. Hard copy sent to Dr Rae Ordonez for review.

## 2021-03-13 DIAGNOSIS — Z23 NEED FOR VACCINATION: ICD-10-CM

## 2021-06-14 ENCOUNTER — PATIENT MESSAGE (OUTPATIENT)
Dept: PAIN CLINIC | Facility: CLINIC | Age: 66
End: 2021-06-14

## 2021-06-15 NOTE — TELEPHONE ENCOUNTER
From: Nathaly Latif  To: Marissa Finch MD  Sent: 6/14/2021 4:48 PM CDT  Subject: Non-Urgent Medical Question    I would like to schedule an appointment for another pain shot. the last one was January 2020 I can be reached at [phone].    I have be

## 2021-06-25 ENCOUNTER — TELEMEDICINE (OUTPATIENT)
Dept: PAIN CLINIC | Facility: CLINIC | Age: 66
End: 2021-06-25
Payer: MEDICARE

## 2021-06-25 DIAGNOSIS — G89.29 CHRONIC RIGHT-SIDED THORACIC BACK PAIN: Primary | ICD-10-CM

## 2021-06-25 DIAGNOSIS — R97.20 ELEVATED PSA: ICD-10-CM

## 2021-06-25 DIAGNOSIS — M54.6 CHRONIC RIGHT-SIDED THORACIC BACK PAIN: Primary | ICD-10-CM

## 2021-06-25 PROCEDURE — 99214 OFFICE O/P EST MOD 30 MIN: CPT | Performed by: ANESTHESIOLOGY

## 2021-06-25 NOTE — PROGRESS NOTES
Telehealth outside of [address] Verbal Consent   I conducted a telehealth visit with Sunday Chavarria today, 06/25/21, which was completed using two-way, real-time interactive audio and video communication.  This has been done in good magen to pr excellent relief. Since his last office visit, the patient has been diagnosed with prostate CA. He has completed radiation therapy and is currently on hormone therapy. Pt denies any chills, fever, or weakness.  There is no bladder or bowel incontinence being weaned with some hormone therapy. Based upon his symptoms, I recommended updated the thoracic x-ray. I do not have any recent imaging. I will order this for him.   After his imaging is completed, then we can certainly discuss repeating intercostal

## 2021-06-28 ENCOUNTER — PATIENT MESSAGE (OUTPATIENT)
Dept: PAIN CLINIC | Facility: CLINIC | Age: 66
End: 2021-06-28

## 2021-06-28 DIAGNOSIS — G58.8 INTERCOSTAL NEURALGIA: Primary | ICD-10-CM

## 2021-06-28 NOTE — TELEPHONE ENCOUNTER
ASSESSMENT AND PLAN:   Chronic right-sided thoracic back pain  (primary encounter diagnosis)  Elevated PSA     The patient is a very pleasant 69-year-old gentleman with a history of thoracic spine pain. This is primarily on the right side.   It is made wor

## 2021-06-28 NOTE — TELEPHONE ENCOUNTER
From: Marixa Hurley  To: Daquan Meneses MD  Sent: 6/28/2021 9:14 AM CDT  Subject: Visit Follow-up Question    Can you send over the order for the Xray or do I just call central scheduling at [facility] to schedule the follow up shots we discusse

## 2021-06-28 NOTE — TELEPHONE ENCOUNTER
From: Cassidy Pedroza  To: Tequila Deleon MD  Sent: 6/28/2021 10:17 AM CDT  Subject: Non-Urgent Medical Question    I am scheduled to get the Xray tomorrow. How soon can I schedule an appointment to have Dr Lynette Mccoy give me the shots at [facility]?

## 2021-06-29 ENCOUNTER — HOSPITAL ENCOUNTER (OUTPATIENT)
Dept: GENERAL RADIOLOGY | Facility: HOSPITAL | Age: 66
Discharge: HOME | End: 2021-06-29
Attending: ANESTHESIOLOGY
Payer: MEDICARE

## 2021-06-29 DIAGNOSIS — M54.6 CHRONIC RIGHT-SIDED THORACIC BACK PAIN: ICD-10-CM

## 2021-06-29 DIAGNOSIS — G89.29 CHRONIC RIGHT-SIDED THORACIC BACK PAIN: ICD-10-CM

## 2021-06-29 PROCEDURE — 72072 X-RAY EXAM THORAC SPINE 3VWS: CPT | Performed by: ANESTHESIOLOGY

## 2021-06-29 NOTE — TELEPHONE ENCOUNTER
Pt calling to see if he can schedule his injection. I informed the patient of our process and that Dr. Leslie Morgan still has to review imaging. Pt aware that Dr. Leslie Morgan has to place the order and we would reach out when we are ready to schedule.  FYI pt would like t

## 2021-06-30 ENCOUNTER — TELEPHONE (OUTPATIENT)
Dept: PAIN CLINIC | Facility: CLINIC | Age: 66
End: 2021-06-30

## 2021-06-30 DIAGNOSIS — G58.8 INTERCOSTAL NEURALGIA: Primary | ICD-10-CM

## 2021-06-30 NOTE — TELEPHONE ENCOUNTER
Question Answer Comment   Anesthesia Type Local    Provider Romelia Bolaños    Location Lab    Procedure Other (please add comment)    Medical clearance requested (will send to Pain Navigator) No    Patient has Medicare coverage?  Yes    Comments (Please list entire p

## 2021-07-01 ENCOUNTER — HOSPITAL ENCOUNTER (OUTPATIENT)
Facility: HOSPITAL | Age: 66
Setting detail: HOSPITAL OUTPATIENT SURGERY
Discharge: HOME | End: 2021-07-01
Attending: ANESTHESIOLOGY | Admitting: ANESTHESIOLOGY
Payer: MEDICARE

## 2021-07-01 ENCOUNTER — APPOINTMENT (OUTPATIENT)
Dept: GENERAL RADIOLOGY | Facility: HOSPITAL | Age: 66
End: 2021-07-01
Attending: ANESTHESIOLOGY
Payer: MEDICARE

## 2021-07-01 VITALS
OXYGEN SATURATION: 96 % | DIASTOLIC BLOOD PRESSURE: 99 MMHG | SYSTOLIC BLOOD PRESSURE: 144 MMHG | HEART RATE: 67 BPM | TEMPERATURE: 98 F | RESPIRATION RATE: 18 BRPM

## 2021-07-01 DIAGNOSIS — G58.8 INTERCOSTAL NEURALGIA: ICD-10-CM

## 2021-07-01 LAB — GLUCOSE BLD-MCNC: 114 MG/DL (ref 70–99)

## 2021-07-01 PROCEDURE — 82962 GLUCOSE BLOOD TEST: CPT

## 2021-07-01 PROCEDURE — 3E0T3BZ INTRODUCTION OF ANESTHETIC AGENT INTO PERIPHERAL NERVES AND PLEXI, PERCUTANEOUS APPROACH: ICD-10-PCS | Performed by: ANESTHESIOLOGY

## 2021-07-01 PROCEDURE — 3E0T33Z INTRODUCTION OF ANTI-INFLAMMATORY INTO PERIPHERAL NERVES AND PLEXI, PERCUTANEOUS APPROACH: ICD-10-PCS | Performed by: ANESTHESIOLOGY

## 2021-07-01 RX ORDER — DIPHENHYDRAMINE HYDROCHLORIDE 50 MG/ML
50 INJECTION INTRAMUSCULAR; INTRAVENOUS ONCE AS NEEDED
Status: DISCONTINUED | OUTPATIENT
Start: 2021-07-01 | End: 2021-07-01

## 2021-07-01 RX ORDER — BUPIVACAINE HYDROCHLORIDE 5 MG/ML
INJECTION, SOLUTION EPIDURAL; INTRACAUDAL
Status: DISCONTINUED | OUTPATIENT
Start: 2021-07-01 | End: 2021-07-01

## 2021-07-01 RX ORDER — ONDANSETRON 2 MG/ML
4 INJECTION INTRAMUSCULAR; INTRAVENOUS ONCE AS NEEDED
Status: DISCONTINUED | OUTPATIENT
Start: 2021-07-01 | End: 2021-07-01

## 2021-07-01 RX ORDER — LIDOCAINE HYDROCHLORIDE 10 MG/ML
INJECTION, SOLUTION INFILTRATION; PERINEURAL
Status: DISCONTINUED | OUTPATIENT
Start: 2021-07-01 | End: 2021-07-01

## 2021-07-01 RX ORDER — METHYLPREDNISOLONE ACETATE 40 MG/ML
INJECTION, SUSPENSION INTRA-ARTICULAR; INTRALESIONAL; INTRAMUSCULAR; SOFT TISSUE
Status: DISCONTINUED | OUTPATIENT
Start: 2021-07-01 | End: 2021-07-01

## 2021-07-01 RX ORDER — DEXTROSE MONOHYDRATE 25 G/50ML
50 INJECTION, SOLUTION INTRAVENOUS
Status: DISCONTINUED | OUTPATIENT
Start: 2021-07-01 | End: 2021-07-01

## 2021-07-01 RX ORDER — INSULIN ASPART 100 [IU]/ML
3 INJECTION, SOLUTION INTRAVENOUS; SUBCUTANEOUS ONCE
Status: DISCONTINUED | OUTPATIENT
Start: 2021-07-01 | End: 2021-07-01

## 2021-07-01 RX ORDER — SODIUM CHLORIDE, SODIUM LACTATE, POTASSIUM CHLORIDE, CALCIUM CHLORIDE 600; 310; 30; 20 MG/100ML; MG/100ML; MG/100ML; MG/100ML
100 INJECTION, SOLUTION INTRAVENOUS CONTINUOUS
Status: DISCONTINUED | OUTPATIENT
Start: 2021-07-01 | End: 2021-07-01

## 2021-07-01 NOTE — H&P
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

## 2021-07-01 NOTE — OPERATIVE REPORT
[facility]  Operative Report  2021     Amy Potts Patient Status:  Hospital Outpatient Surgery    1955 MRN [id number]   Location [id number] [place] [address] Attending Karina Pepe MD   [facility] Day # 0 IMANI Augustin patient was observed until discharge criteria met. Discharge instructions were given and patient was released to a responsible adult. Complications: None. Follow up: The patient was followed in the pain clinic as needed basis.     Jerry Pruett MD

## 2021-11-01 ENCOUNTER — LAB ENCOUNTER (OUTPATIENT)
Dept: LAB | Facility: HOSPITAL | Age: 66
End: 2021-11-01
Attending: FAMILY MEDICINE
Payer: MEDICARE

## 2021-11-01 DIAGNOSIS — R49.0 HOARSE VOICE QUALITY: ICD-10-CM

## 2021-11-01 DIAGNOSIS — E66.01 CLASS 3 SEVERE OBESITY DUE TO EXCESS CALORIES WITH BODY MASS INDEX (BMI) OF 40.0 TO 44.9 IN ADULT, UNSPECIFIED WHETHER SERIOUS COMORBIDITY PRESENT (HCC): ICD-10-CM

## 2021-11-01 DIAGNOSIS — J02.9 SORE THROAT: ICD-10-CM

## 2021-11-01 DIAGNOSIS — C61 PROSTATE CANCER (HCC): ICD-10-CM

## 2021-11-01 DIAGNOSIS — I25.10 ATHEROSCLEROSIS OF NATIVE CORONARY ARTERY OF NATIVE HEART WITHOUT ANGINA PECTORIS: ICD-10-CM

## 2021-12-02 ENCOUNTER — LAB ENCOUNTER (OUTPATIENT)
Dept: LAB | Facility: HOSPITAL | Age: 66
End: 2021-12-02
Attending: FAMILY MEDICINE
Payer: MEDICARE

## 2021-12-02 ENCOUNTER — ORDER TRANSCRIPTION (OUTPATIENT)
Dept: ADMINISTRATIVE | Facility: HOSPITAL | Age: 66
End: 2021-12-02

## 2021-12-02 DIAGNOSIS — C61 PROSTATE CANCER (HCC): ICD-10-CM

## 2021-12-02 DIAGNOSIS — E66.9 OBESITY: ICD-10-CM

## 2021-12-02 DIAGNOSIS — Z20.822 EXPOSURE TO 2019 NOVEL CORONAVIRUS: ICD-10-CM

## 2021-12-02 DIAGNOSIS — Z20.822 EXPOSURE TO 2019 NOVEL CORONAVIRUS: Primary | ICD-10-CM

## 2022-02-04 ENCOUNTER — TELEPHONE (OUTPATIENT)
Dept: PHYSICAL THERAPY | Facility: HOSPITAL | Age: 67
End: 2022-02-04

## 2022-02-04 ENCOUNTER — ORDER TRANSCRIPTION (OUTPATIENT)
Dept: PHYSICAL THERAPY | Facility: HOSPITAL | Age: 67
End: 2022-02-04

## 2022-02-07 ENCOUNTER — OFFICE VISIT (OUTPATIENT)
Dept: PHYSICAL THERAPY | Age: 67
End: 2022-02-07
Attending: FAMILY MEDICINE
Payer: MEDICARE

## 2022-02-07 ENCOUNTER — APPOINTMENT (OUTPATIENT)
Dept: PHYSICAL THERAPY | Age: 67
End: 2022-02-07
Attending: FAMILY MEDICINE
Payer: MEDICARE

## 2022-02-07 DIAGNOSIS — G89.29 CHRONIC BILATERAL LOW BACK PAIN WITHOUT SCIATICA: ICD-10-CM

## 2022-02-07 DIAGNOSIS — M17.9 OSTEOARTHRITIS OF KNEE, UNSPECIFIED: ICD-10-CM

## 2022-02-07 DIAGNOSIS — M54.50 CHRONIC BILATERAL LOW BACK PAIN WITHOUT SCIATICA: ICD-10-CM

## 2022-02-07 DIAGNOSIS — M25.562 CHRONIC PAIN OF LEFT KNEE: ICD-10-CM

## 2022-02-07 DIAGNOSIS — M51.36 DISC DEGENERATION, LUMBAR: ICD-10-CM

## 2022-02-07 DIAGNOSIS — G89.29 CHRONIC PAIN OF RIGHT KNEE: ICD-10-CM

## 2022-02-07 DIAGNOSIS — M25.561 CHRONIC PAIN OF RIGHT KNEE: ICD-10-CM

## 2022-02-07 DIAGNOSIS — G89.29 CHRONIC PAIN OF LEFT KNEE: ICD-10-CM

## 2022-02-07 PROCEDURE — 97162 PT EVAL MOD COMPLEX 30 MIN: CPT

## 2022-02-07 PROCEDURE — 97140 MANUAL THERAPY 1/> REGIONS: CPT

## 2022-02-09 ENCOUNTER — OFFICE VISIT (OUTPATIENT)
Dept: PHYSICAL THERAPY | Age: 67
End: 2022-02-09
Attending: FAMILY MEDICINE
Payer: MEDICARE

## 2022-02-09 DIAGNOSIS — G89.29 CHRONIC BILATERAL LOW BACK PAIN WITHOUT SCIATICA: ICD-10-CM

## 2022-02-09 DIAGNOSIS — M25.562 CHRONIC PAIN OF LEFT KNEE: ICD-10-CM

## 2022-02-09 DIAGNOSIS — M51.36 DISC DEGENERATION, LUMBAR: ICD-10-CM

## 2022-02-09 DIAGNOSIS — G89.29 CHRONIC PAIN OF RIGHT KNEE: ICD-10-CM

## 2022-02-09 DIAGNOSIS — G89.29 CHRONIC PAIN OF LEFT KNEE: ICD-10-CM

## 2022-02-09 DIAGNOSIS — M54.50 CHRONIC BILATERAL LOW BACK PAIN WITHOUT SCIATICA: ICD-10-CM

## 2022-02-09 DIAGNOSIS — M25.561 CHRONIC PAIN OF RIGHT KNEE: ICD-10-CM

## 2022-02-09 DIAGNOSIS — M17.9 OSTEOARTHRITIS OF KNEE, UNSPECIFIED: ICD-10-CM

## 2022-02-09 PROCEDURE — 97110 THERAPEUTIC EXERCISES: CPT

## 2022-02-09 PROCEDURE — 97140 MANUAL THERAPY 1/> REGIONS: CPT

## 2022-02-14 ENCOUNTER — OFFICE VISIT (OUTPATIENT)
Dept: PHYSICAL THERAPY | Age: 67
End: 2022-02-14
Attending: FAMILY MEDICINE
Payer: MEDICARE

## 2022-02-14 PROCEDURE — 97140 MANUAL THERAPY 1/> REGIONS: CPT

## 2022-02-14 PROCEDURE — 97110 THERAPEUTIC EXERCISES: CPT

## 2022-02-16 ENCOUNTER — APPOINTMENT (OUTPATIENT)
Dept: PHYSICAL THERAPY | Age: 67
End: 2022-02-16
Attending: FAMILY MEDICINE
Payer: MEDICARE

## 2022-02-16 ENCOUNTER — TELEPHONE (OUTPATIENT)
Dept: PHYSICAL THERAPY | Facility: HOSPITAL | Age: 67
End: 2022-02-16

## 2022-02-22 ENCOUNTER — OFFICE VISIT (OUTPATIENT)
Dept: PHYSICAL THERAPY | Age: 67
End: 2022-02-22
Attending: FAMILY MEDICINE
Payer: MEDICARE

## 2022-02-22 PROCEDURE — 97140 MANUAL THERAPY 1/> REGIONS: CPT

## 2022-02-22 PROCEDURE — 97110 THERAPEUTIC EXERCISES: CPT

## 2022-02-24 ENCOUNTER — OFFICE VISIT (OUTPATIENT)
Dept: PHYSICAL THERAPY | Age: 67
End: 2022-02-24
Attending: FAMILY MEDICINE
Payer: MEDICARE

## 2022-02-24 PROCEDURE — 97110 THERAPEUTIC EXERCISES: CPT

## 2022-02-24 PROCEDURE — 97140 MANUAL THERAPY 1/> REGIONS: CPT

## 2022-02-26 ENCOUNTER — LAB ENCOUNTER (OUTPATIENT)
Dept: LAB | Age: 67
End: 2022-02-26
Attending: INTERNAL MEDICINE
Payer: MEDICARE

## 2022-02-26 DIAGNOSIS — Z01.818 PRE-OP TESTING: ICD-10-CM

## 2022-02-28 LAB — SARS-COV-2 RNA RESP QL NAA+PROBE: NOT DETECTED

## 2022-03-01 ENCOUNTER — ANESTHESIA (OUTPATIENT)
Dept: ENDOSCOPY | Facility: HOSPITAL | Age: 67
End: 2022-03-01
Payer: MEDICARE

## 2022-03-01 ENCOUNTER — APPOINTMENT (OUTPATIENT)
Dept: PHYSICAL THERAPY | Age: 67
End: 2022-03-01
Attending: FAMILY MEDICINE
Payer: MEDICARE

## 2022-03-01 ENCOUNTER — ANESTHESIA EVENT (OUTPATIENT)
Dept: ENDOSCOPY | Facility: HOSPITAL | Age: 67
End: 2022-03-01
Payer: MEDICARE

## 2022-03-01 ENCOUNTER — HOSPITAL ENCOUNTER (OUTPATIENT)
Facility: HOSPITAL | Age: 67
Setting detail: HOSPITAL OUTPATIENT SURGERY
Discharge: HOME | End: 2022-03-01
Attending: INTERNAL MEDICINE | Admitting: INTERNAL MEDICINE
Payer: MEDICARE

## 2022-03-01 VITALS
WEIGHT: 310 LBS | HEIGHT: 72 IN | TEMPERATURE: 97 F | DIASTOLIC BLOOD PRESSURE: 66 MMHG | RESPIRATION RATE: 18 BRPM | OXYGEN SATURATION: 97 % | HEART RATE: 66 BPM | SYSTOLIC BLOOD PRESSURE: 111 MMHG | BODY MASS INDEX: 41.99 KG/M2

## 2022-03-01 DIAGNOSIS — Z12.11 COLON CANCER SCREENING: ICD-10-CM

## 2022-03-01 PROCEDURE — 0DJD8ZZ INSPECTION OF LOWER INTESTINAL TRACT, VIA NATURAL OR ARTIFICIAL OPENING ENDOSCOPIC: ICD-10-PCS | Performed by: INTERNAL MEDICINE

## 2022-03-01 RX ORDER — LIDOCAINE HYDROCHLORIDE 10 MG/ML
INJECTION, SOLUTION EPIDURAL; INFILTRATION; INTRACAUDAL; PERINEURAL AS NEEDED
Status: DISCONTINUED | OUTPATIENT
Start: 2022-03-01 | End: 2022-03-01 | Stop reason: SURG

## 2022-03-01 RX ORDER — SODIUM CHLORIDE, SODIUM LACTATE, POTASSIUM CHLORIDE, CALCIUM CHLORIDE 600; 310; 30; 20 MG/100ML; MG/100ML; MG/100ML; MG/100ML
INJECTION, SOLUTION INTRAVENOUS CONTINUOUS
Status: DISCONTINUED | OUTPATIENT
Start: 2022-03-01 | End: 2022-03-01

## 2022-03-01 RX ORDER — NICOTINE POLACRILEX 4 MG
15 LOZENGE BUCCAL
OUTPATIENT
Start: 2022-03-01

## 2022-03-01 RX ORDER — DEXTROSE MONOHYDRATE 25 G/50ML
50 INJECTION, SOLUTION INTRAVENOUS
OUTPATIENT
Start: 2022-03-01

## 2022-03-01 RX ORDER — SODIUM CHLORIDE, SODIUM LACTATE, POTASSIUM CHLORIDE, CALCIUM CHLORIDE 600; 310; 30; 20 MG/100ML; MG/100ML; MG/100ML; MG/100ML
INJECTION, SOLUTION INTRAVENOUS CONTINUOUS
OUTPATIENT
Start: 2022-03-01

## 2022-03-01 RX ORDER — NICOTINE POLACRILEX 4 MG
30 LOZENGE BUCCAL
OUTPATIENT
Start: 2022-03-01

## 2022-03-01 RX ORDER — NALOXONE HYDROCHLORIDE 0.4 MG/ML
80 INJECTION, SOLUTION INTRAMUSCULAR; INTRAVENOUS; SUBCUTANEOUS AS NEEDED
OUTPATIENT
Start: 2022-03-01 | End: 2022-03-01

## 2022-03-01 RX ADMIN — LIDOCAINE HYDROCHLORIDE 50 MG: 10 INJECTION, SOLUTION EPIDURAL; INFILTRATION; INTRACAUDAL; PERINEURAL at 09:00:00

## 2022-03-01 NOTE — ANESTHESIA POSTPROCEDURE EVALUATION
[facility] Patient Status:  Hospital Outpatient Surgery   Age/Gender 78year old male MRN [id number]   Location [address] [address] Attending Ang Salas MD   [facility] Day # 0 PCP James Balbuena MD       Anesthesia Post-op Note    COLONOSCOPY    Procedure Summary     Date: 03/01/22 Room / Location: [facility] ENDOSCOPY 02 / [facility] ENDOSCOPY    Anesthesia Start: 0651 Anesthesia Stop: 6976    Procedure: COLONOSCOPY (N/A ) Diagnosis:       Colon cancer screening      (diverticulosis)    Surgeons: Ang Salas MD Anesthesiologist: Millie Almaguer MD    Anesthesia Type: MAC ASA Status: 3          Anesthesia Type: MAC    Vitals Value Taken Time   /77 03/01/22 0921   Temp 98 03/01/22 0921   Pulse 66 03/01/22 0920   Resp 18 03/01/22 0921   SpO2 97 % 03/01/22 0920   Vitals shown include unvalidated device data.     Patient Location: Endoscopy    Anesthesia Type: MAC    Airway Patency: patent    Postop Pain Control: adequate    Mental Status: mildly sedated but able to meaningfully participate in the post-anesthesia evaluation    Nausea/Vomiting: none    Cardiopulmonary/Hydration status: stable euvolemic    Complications: no apparent anesthesia related complications    Postop vital signs: stable    Dental Exam: Unchanged from Preop

## 2022-03-03 ENCOUNTER — OFFICE VISIT (OUTPATIENT)
Dept: PHYSICAL THERAPY | Age: 67
End: 2022-03-03
Attending: FAMILY MEDICINE
Payer: MEDICARE

## 2022-03-03 PROCEDURE — 97140 MANUAL THERAPY 1/> REGIONS: CPT

## 2022-03-03 PROCEDURE — 97110 THERAPEUTIC EXERCISES: CPT

## 2022-03-07 ENCOUNTER — OFFICE VISIT (OUTPATIENT)
Dept: PHYSICAL THERAPY | Age: 67
End: 2022-03-07
Attending: FAMILY MEDICINE
Payer: MEDICARE

## 2022-03-07 PROCEDURE — 97110 THERAPEUTIC EXERCISES: CPT

## 2022-03-07 PROCEDURE — 97140 MANUAL THERAPY 1/> REGIONS: CPT

## 2022-03-11 ENCOUNTER — APPOINTMENT (OUTPATIENT)
Dept: PHYSICAL THERAPY | Age: 67
End: 2022-03-11
Attending: FAMILY MEDICINE
Payer: MEDICARE

## 2022-03-16 ENCOUNTER — OFFICE VISIT (OUTPATIENT)
Dept: PHYSICAL THERAPY | Age: 67
End: 2022-03-16
Attending: FAMILY MEDICINE
Payer: MEDICARE

## 2022-03-16 PROCEDURE — 97110 THERAPEUTIC EXERCISES: CPT

## 2022-03-16 PROCEDURE — 97140 MANUAL THERAPY 1/> REGIONS: CPT

## 2022-03-23 ENCOUNTER — OFFICE VISIT (OUTPATIENT)
Dept: PHYSICAL THERAPY | Age: 67
End: 2022-03-23
Attending: FAMILY MEDICINE
Payer: MEDICARE

## 2022-03-23 PROCEDURE — 97110 THERAPEUTIC EXERCISES: CPT

## 2022-03-30 ENCOUNTER — APPOINTMENT (OUTPATIENT)
Dept: PHYSICAL THERAPY | Age: 67
End: 2022-03-30
Attending: FAMILY MEDICINE
Payer: MEDICARE

## 2022-04-05 ENCOUNTER — APPOINTMENT (OUTPATIENT)
Dept: PHYSICAL THERAPY | Age: 67
End: 2022-04-05
Attending: FAMILY MEDICINE
Payer: MEDICARE

## 2022-04-12 ENCOUNTER — TELEPHONE (OUTPATIENT)
Dept: PHYSICAL THERAPY | Facility: HOSPITAL | Age: 67
End: 2022-04-12

## 2022-04-12 ENCOUNTER — OFFICE VISIT (OUTPATIENT)
Dept: PHYSICAL THERAPY | Age: 67
End: 2022-04-12
Attending: FAMILY MEDICINE
Payer: MEDICARE

## 2022-04-12 PROCEDURE — 97110 THERAPEUTIC EXERCISES: CPT

## 2022-04-25 LAB — AMB EXT PSA SCREEN: 0.07 NG/ML

## 2022-04-28 ENCOUNTER — TELEPHONE (OUTPATIENT)
Dept: SURGERY | Facility: CLINIC | Age: 67
End: 2022-04-28

## 2022-04-28 LAB — TESTOSTERONE: 219

## 2022-05-09 ENCOUNTER — OFFICE VISIT (OUTPATIENT)
Dept: PHYSICAL THERAPY | Age: 67
End: 2022-05-09
Attending: FAMILY MEDICINE
Payer: MEDICARE

## 2022-05-09 PROCEDURE — 97110 THERAPEUTIC EXERCISES: CPT

## 2022-05-19 ENCOUNTER — APPOINTMENT (OUTPATIENT)
Dept: PHYSICAL THERAPY | Age: 67
End: 2022-05-19
Attending: FAMILY MEDICINE
Payer: MEDICARE

## 2022-08-02 ENCOUNTER — TELEPHONE (OUTPATIENT)
Dept: PHYSICAL THERAPY | Facility: HOSPITAL | Age: 67
End: 2022-08-02

## 2022-08-08 ENCOUNTER — ORDER TRANSCRIPTION (OUTPATIENT)
Dept: PHYSICAL THERAPY | Facility: HOSPITAL | Age: 67
End: 2022-08-08

## 2022-08-08 DIAGNOSIS — Z96.659 PRESENCE OF UNSPECIFIED ARTIFICIAL KNEE JOINT: Primary | ICD-10-CM

## 2022-08-16 ENCOUNTER — TELEPHONE (OUTPATIENT)
Dept: PHYSICAL THERAPY | Facility: HOSPITAL | Age: 67
End: 2022-08-16

## 2022-08-17 ENCOUNTER — OFFICE VISIT (OUTPATIENT)
Dept: PHYSICAL THERAPY | Age: 67
End: 2022-08-17
Payer: MEDICARE

## 2022-08-17 ENCOUNTER — APPOINTMENT (OUTPATIENT)
Dept: PHYSICAL THERAPY | Age: 67
End: 2022-08-17
Attending: FAMILY MEDICINE
Payer: MEDICARE

## 2022-08-17 DIAGNOSIS — Z96.659 PRESENCE OF UNSPECIFIED ARTIFICIAL KNEE JOINT: ICD-10-CM

## 2022-08-17 PROCEDURE — 97110 THERAPEUTIC EXERCISES: CPT

## 2022-08-17 PROCEDURE — 97161 PT EVAL LOW COMPLEX 20 MIN: CPT

## 2022-08-19 ENCOUNTER — OFFICE VISIT (OUTPATIENT)
Dept: PHYSICAL THERAPY | Age: 67
End: 2022-08-19
Payer: MEDICARE

## 2022-08-19 ENCOUNTER — APPOINTMENT (OUTPATIENT)
Dept: PHYSICAL THERAPY | Age: 67
End: 2022-08-19
Attending: FAMILY MEDICINE
Payer: MEDICARE

## 2022-08-19 DIAGNOSIS — Z96.659 PRESENCE OF UNSPECIFIED ARTIFICIAL KNEE JOINT: ICD-10-CM

## 2022-08-19 PROCEDURE — 97140 MANUAL THERAPY 1/> REGIONS: CPT

## 2022-08-19 PROCEDURE — 97110 THERAPEUTIC EXERCISES: CPT

## 2022-08-22 ENCOUNTER — APPOINTMENT (OUTPATIENT)
Dept: PHYSICAL THERAPY | Age: 67
End: 2022-08-22
Attending: FAMILY MEDICINE
Payer: MEDICARE

## 2022-08-22 ENCOUNTER — OFFICE VISIT (OUTPATIENT)
Dept: PHYSICAL THERAPY | Age: 67
End: 2022-08-22
Payer: MEDICARE

## 2022-08-22 DIAGNOSIS — Z96.659 PRESENCE OF UNSPECIFIED ARTIFICIAL KNEE JOINT: ICD-10-CM

## 2022-08-22 PROCEDURE — 97110 THERAPEUTIC EXERCISES: CPT

## 2022-08-22 PROCEDURE — 97140 MANUAL THERAPY 1/> REGIONS: CPT

## 2022-08-24 ENCOUNTER — OFFICE VISIT (OUTPATIENT)
Dept: PHYSICAL THERAPY | Age: 67
End: 2022-08-24
Payer: MEDICARE

## 2022-08-24 PROCEDURE — 97140 MANUAL THERAPY 1/> REGIONS: CPT

## 2022-08-24 PROCEDURE — 97110 THERAPEUTIC EXERCISES: CPT

## 2022-08-29 ENCOUNTER — APPOINTMENT (OUTPATIENT)
Dept: PHYSICAL THERAPY | Age: 67
End: 2022-08-29
Payer: MEDICARE

## 2022-08-29 ENCOUNTER — OFFICE VISIT (OUTPATIENT)
Dept: PHYSICAL THERAPY | Age: 67
End: 2022-08-29
Payer: MEDICARE

## 2022-08-29 DIAGNOSIS — Z96.659 PRESENCE OF UNSPECIFIED ARTIFICIAL KNEE JOINT: ICD-10-CM

## 2022-08-29 PROCEDURE — 97140 MANUAL THERAPY 1/> REGIONS: CPT

## 2022-08-29 PROCEDURE — 97110 THERAPEUTIC EXERCISES: CPT

## 2022-08-31 ENCOUNTER — OFFICE VISIT (OUTPATIENT)
Dept: PHYSICAL THERAPY | Age: 67
End: 2022-08-31
Payer: MEDICARE

## 2022-08-31 ENCOUNTER — APPOINTMENT (OUTPATIENT)
Dept: PHYSICAL THERAPY | Age: 67
End: 2022-08-31
Payer: MEDICARE

## 2022-08-31 DIAGNOSIS — Z96.659 PRESENCE OF UNSPECIFIED ARTIFICIAL KNEE JOINT: ICD-10-CM

## 2022-08-31 PROCEDURE — 97110 THERAPEUTIC EXERCISES: CPT

## 2022-08-31 PROCEDURE — 97140 MANUAL THERAPY 1/> REGIONS: CPT

## 2022-09-06 ENCOUNTER — OFFICE VISIT (OUTPATIENT)
Dept: PHYSICAL THERAPY | Age: 67
End: 2022-09-06
Payer: MEDICARE

## 2022-09-06 ENCOUNTER — APPOINTMENT (OUTPATIENT)
Dept: PHYSICAL THERAPY | Age: 67
End: 2022-09-06
Payer: MEDICARE

## 2022-09-06 DIAGNOSIS — Z96.659 PRESENCE OF UNSPECIFIED ARTIFICIAL KNEE JOINT: ICD-10-CM

## 2022-09-06 PROCEDURE — 97110 THERAPEUTIC EXERCISES: CPT

## 2022-09-06 PROCEDURE — 97140 MANUAL THERAPY 1/> REGIONS: CPT

## 2022-09-08 ENCOUNTER — APPOINTMENT (OUTPATIENT)
Dept: PHYSICAL THERAPY | Age: 67
End: 2022-09-08
Payer: MEDICARE

## 2022-09-08 ENCOUNTER — OFFICE VISIT (OUTPATIENT)
Dept: PHYSICAL THERAPY | Age: 67
End: 2022-09-08
Payer: MEDICARE

## 2022-09-08 DIAGNOSIS — Z96.659 PRESENCE OF UNSPECIFIED ARTIFICIAL KNEE JOINT: ICD-10-CM

## 2022-09-08 PROCEDURE — 97110 THERAPEUTIC EXERCISES: CPT

## 2022-09-08 PROCEDURE — 97140 MANUAL THERAPY 1/> REGIONS: CPT

## 2022-09-12 ENCOUNTER — APPOINTMENT (OUTPATIENT)
Dept: PHYSICAL THERAPY | Age: 67
End: 2022-09-12
Payer: MEDICARE

## 2022-09-12 ENCOUNTER — OFFICE VISIT (OUTPATIENT)
Dept: PHYSICAL THERAPY | Age: 67
End: 2022-09-12
Payer: MEDICARE

## 2022-09-12 DIAGNOSIS — Z96.659 PRESENCE OF UNSPECIFIED ARTIFICIAL KNEE JOINT: ICD-10-CM

## 2022-09-12 PROCEDURE — 97140 MANUAL THERAPY 1/> REGIONS: CPT

## 2022-09-12 PROCEDURE — 97110 THERAPEUTIC EXERCISES: CPT

## 2022-09-14 ENCOUNTER — APPOINTMENT (OUTPATIENT)
Dept: PHYSICAL THERAPY | Age: 67
End: 2022-09-14
Payer: MEDICARE

## 2022-09-15 ENCOUNTER — APPOINTMENT (OUTPATIENT)
Dept: PHYSICAL THERAPY | Age: 67
End: 2022-09-15
Attending: FAMILY MEDICINE
Payer: MEDICARE

## 2022-09-15 ENCOUNTER — TELEPHONE (OUTPATIENT)
Dept: PHYSICAL THERAPY | Facility: HOSPITAL | Age: 67
End: 2022-09-15

## 2022-09-19 ENCOUNTER — APPOINTMENT (OUTPATIENT)
Dept: PHYSICAL THERAPY | Age: 67
End: 2022-09-19
Payer: MEDICARE

## 2022-09-20 ENCOUNTER — OFFICE VISIT (OUTPATIENT)
Dept: PHYSICAL THERAPY | Age: 67
End: 2022-09-20
Payer: MEDICARE

## 2022-09-20 PROCEDURE — 97110 THERAPEUTIC EXERCISES: CPT

## 2022-09-20 PROCEDURE — 97140 MANUAL THERAPY 1/> REGIONS: CPT

## 2022-09-21 ENCOUNTER — APPOINTMENT (OUTPATIENT)
Dept: PHYSICAL THERAPY | Age: 67
End: 2022-09-21
Payer: MEDICARE

## 2022-09-23 ENCOUNTER — OFFICE VISIT (OUTPATIENT)
Dept: PHYSICAL THERAPY | Age: 67
End: 2022-09-23
Payer: MEDICARE

## 2022-09-23 DIAGNOSIS — Z96.659 PRESENCE OF UNSPECIFIED ARTIFICIAL KNEE JOINT: ICD-10-CM

## 2022-09-23 PROCEDURE — 97110 THERAPEUTIC EXERCISES: CPT

## 2022-09-23 PROCEDURE — 97140 MANUAL THERAPY 1/> REGIONS: CPT

## 2022-09-26 ENCOUNTER — APPOINTMENT (OUTPATIENT)
Dept: PHYSICAL THERAPY | Age: 67
End: 2022-09-26
Payer: MEDICARE

## 2022-09-26 ENCOUNTER — OFFICE VISIT (OUTPATIENT)
Dept: PHYSICAL THERAPY | Age: 67
End: 2022-09-26
Payer: MEDICARE

## 2022-09-26 DIAGNOSIS — Z96.659 PRESENCE OF UNSPECIFIED ARTIFICIAL KNEE JOINT: ICD-10-CM

## 2022-09-26 PROCEDURE — 97140 MANUAL THERAPY 1/> REGIONS: CPT

## 2022-09-26 PROCEDURE — 97110 THERAPEUTIC EXERCISES: CPT

## 2022-09-28 ENCOUNTER — APPOINTMENT (OUTPATIENT)
Dept: PHYSICAL THERAPY | Age: 67
End: 2022-09-28
Payer: MEDICARE

## 2022-09-29 ENCOUNTER — OFFICE VISIT (OUTPATIENT)
Dept: PHYSICAL THERAPY | Age: 67
End: 2022-09-29
Payer: MEDICARE

## 2022-09-29 PROCEDURE — 97140 MANUAL THERAPY 1/> REGIONS: CPT

## 2022-09-29 PROCEDURE — 97110 THERAPEUTIC EXERCISES: CPT

## 2022-10-03 ENCOUNTER — APPOINTMENT (OUTPATIENT)
Dept: PHYSICAL THERAPY | Age: 67
End: 2022-10-03
Payer: MEDICARE

## 2022-10-04 ENCOUNTER — OFFICE VISIT (OUTPATIENT)
Dept: PHYSICAL THERAPY | Age: 67
End: 2022-10-04
Attending: FAMILY MEDICINE
Payer: MEDICARE

## 2022-10-04 PROCEDURE — 97140 MANUAL THERAPY 1/> REGIONS: CPT

## 2022-10-04 PROCEDURE — 97110 THERAPEUTIC EXERCISES: CPT

## 2022-10-12 ENCOUNTER — OFFICE VISIT (OUTPATIENT)
Dept: PHYSICAL THERAPY | Age: 67
End: 2022-10-12
Attending: FAMILY MEDICINE
Payer: MEDICARE

## 2022-10-12 PROCEDURE — 97140 MANUAL THERAPY 1/> REGIONS: CPT

## 2022-10-12 PROCEDURE — 97110 THERAPEUTIC EXERCISES: CPT

## 2022-10-26 ENCOUNTER — APPOINTMENT (OUTPATIENT)
Dept: PHYSICAL THERAPY | Age: 67
End: 2022-10-26
Attending: FAMILY MEDICINE
Payer: MEDICARE

## 2022-11-01 ENCOUNTER — OFFICE VISIT (OUTPATIENT)
Dept: NEUROLOGY | Facility: CLINIC | Age: 67
End: 2022-11-01
Payer: MEDICARE

## 2022-11-01 VITALS
SYSTOLIC BLOOD PRESSURE: 128 MMHG | DIASTOLIC BLOOD PRESSURE: 80 MMHG | HEART RATE: 78 BPM | BODY MASS INDEX: 42 KG/M2 | RESPIRATION RATE: 16 BRPM | WEIGHT: 309 LBS

## 2022-11-01 DIAGNOSIS — R20.2 PARESTHESIAS: Primary | ICD-10-CM

## 2022-11-01 PROCEDURE — 99204 OFFICE O/P NEW MOD 45 MIN: CPT | Performed by: OTHER

## 2022-11-01 RX ORDER — OXYCODONE HYDROCHLORIDE 5 MG/1
5 TABLET ORAL
COMMUNITY
Start: 2022-07-29 | End: 2022-11-01 | Stop reason: ALTCHOICE

## 2022-11-01 RX ORDER — GABAPENTIN 100 MG/1
100 CAPSULE ORAL EVERY 8 HOURS
COMMUNITY
Start: 2022-09-01 | End: 2022-11-01 | Stop reason: ALTCHOICE

## 2022-11-01 RX ORDER — TAMSULOSIN HYDROCHLORIDE 0.4 MG/1
0.4 CAPSULE ORAL 2 TIMES DAILY
COMMUNITY

## 2022-11-01 RX ORDER — SILDENAFIL 100 MG/1
TABLET, FILM COATED ORAL
COMMUNITY
Start: 2022-06-07

## 2022-11-01 RX ORDER — ASPIRIN 81 MG/1
81 TABLET, COATED ORAL 2 TIMES DAILY
COMMUNITY
Start: 2022-07-29 | End: 2022-11-01 | Stop reason: ALTCHOICE

## 2022-11-01 NOTE — PROGRESS NOTES
Pt reports feet feel cold bilaterally. Pt reports that he has noticed this symptom for a year. Pt denies burning, tingling or pain.

## 2022-11-02 ENCOUNTER — APPOINTMENT (OUTPATIENT)
Dept: PHYSICAL THERAPY | Age: 67
End: 2022-11-02
Attending: FAMILY MEDICINE
Payer: MEDICARE

## 2022-11-02 ENCOUNTER — OFFICE VISIT (OUTPATIENT)
Dept: PHYSICAL THERAPY | Age: 67
End: 2022-11-02
Attending: FAMILY MEDICINE
Payer: MEDICARE

## 2022-11-02 PROCEDURE — 97140 MANUAL THERAPY 1/> REGIONS: CPT

## 2022-11-02 PROCEDURE — 97110 THERAPEUTIC EXERCISES: CPT

## 2022-11-04 ENCOUNTER — OFFICE VISIT (OUTPATIENT)
Dept: PHYSICAL THERAPY | Age: 67
End: 2022-11-04
Attending: FAMILY MEDICINE
Payer: MEDICARE

## 2022-11-04 ENCOUNTER — APPOINTMENT (OUTPATIENT)
Dept: PHYSICAL THERAPY | Age: 67
End: 2022-11-04
Attending: FAMILY MEDICINE
Payer: MEDICARE

## 2022-11-04 PROCEDURE — 97140 MANUAL THERAPY 1/> REGIONS: CPT

## 2022-11-04 PROCEDURE — 97110 THERAPEUTIC EXERCISES: CPT

## 2022-11-08 ENCOUNTER — APPOINTMENT (OUTPATIENT)
Dept: PHYSICAL THERAPY | Age: 67
End: 2022-11-08
Attending: FAMILY MEDICINE
Payer: MEDICARE

## 2022-11-11 ENCOUNTER — APPOINTMENT (OUTPATIENT)
Dept: PHYSICAL THERAPY | Age: 67
End: 2022-11-11
Attending: FAMILY MEDICINE
Payer: MEDICARE

## 2023-01-18 ENCOUNTER — HOSPITAL ENCOUNTER (OUTPATIENT)
Dept: CV DIAGNOSTICS | Facility: HOSPITAL | Age: 68
Discharge: HOME | End: 2023-01-18
Attending: FAMILY MEDICINE
Payer: MEDICARE

## 2023-01-18 DIAGNOSIS — I77.810 DILATION OF THORACIC AORTA (HCC): ICD-10-CM

## 2023-01-18 DIAGNOSIS — I25.10 CORONARY ARTERY CALCIFICATION: ICD-10-CM

## 2023-01-18 DIAGNOSIS — I71.20 ANEURYSM, AORTA, THORACIC: ICD-10-CM

## 2023-01-18 DIAGNOSIS — I25.84 CORONARY ARTERY CALCIFICATION: ICD-10-CM

## 2023-01-18 PROCEDURE — 93306 TTE W/DOPPLER COMPLETE: CPT | Performed by: FAMILY MEDICINE

## 2023-01-30 ENCOUNTER — TELEPHONE (OUTPATIENT)
Dept: PHYSICAL THERAPY | Facility: HOSPITAL | Age: 68
End: 2023-01-30

## 2023-01-31 ENCOUNTER — ORDER TRANSCRIPTION (OUTPATIENT)
Dept: PHYSICAL THERAPY | Facility: HOSPITAL | Age: 68
End: 2023-01-31

## 2023-01-31 DIAGNOSIS — M25.561 RIGHT KNEE PAIN: ICD-10-CM

## 2023-01-31 DIAGNOSIS — Z96.651 PRESENCE OF RIGHT ARTIFICIAL KNEE JOINT: Primary | ICD-10-CM

## 2023-02-01 ENCOUNTER — OFFICE VISIT (OUTPATIENT)
Dept: PHYSICAL THERAPY | Age: 68
End: 2023-02-01
Attending: FAMILY MEDICINE
Payer: MEDICARE

## 2023-02-01 ENCOUNTER — HOSPITAL ENCOUNTER (OUTPATIENT)
Dept: ULTRASOUND IMAGING | Age: 68
Discharge: HOME | End: 2023-02-01
Attending: FAMILY MEDICINE
Payer: MEDICARE

## 2023-02-01 DIAGNOSIS — E01.0 THYROMEGALY: ICD-10-CM

## 2023-02-01 DIAGNOSIS — E03.9 HYPOTHYROIDISM: ICD-10-CM

## 2023-02-01 DIAGNOSIS — Z80.51 FAMILY HISTORY OF KIDNEY CANCER: ICD-10-CM

## 2023-02-01 DIAGNOSIS — C61 PROSTATE CANCER (HCC): ICD-10-CM

## 2023-02-01 DIAGNOSIS — Z96.651 PRESENCE OF RIGHT ARTIFICIAL KNEE JOINT: ICD-10-CM

## 2023-02-01 DIAGNOSIS — D41.02 NEOPLASM OF UNCERTAIN BEHAVIOR OF LEFT KIDNEY: ICD-10-CM

## 2023-02-01 DIAGNOSIS — M25.561 RIGHT KNEE PAIN: ICD-10-CM

## 2023-02-01 DIAGNOSIS — R79.89 ELEVATED SERUM FREE T4 LEVEL: ICD-10-CM

## 2023-02-01 DIAGNOSIS — N20.0 NEPHROLITHIASIS: ICD-10-CM

## 2023-02-01 PROCEDURE — 97161 PT EVAL LOW COMPLEX 20 MIN: CPT

## 2023-02-01 PROCEDURE — 76536 US EXAM OF HEAD AND NECK: CPT | Performed by: FAMILY MEDICINE

## 2023-02-01 PROCEDURE — 76775 US EXAM ABDO BACK WALL LIM: CPT | Performed by: FAMILY MEDICINE

## 2023-02-01 PROCEDURE — 97110 THERAPEUTIC EXERCISES: CPT

## 2023-02-08 ENCOUNTER — OFFICE VISIT (OUTPATIENT)
Dept: PHYSICAL THERAPY | Age: 68
End: 2023-02-08
Attending: FAMILY MEDICINE
Payer: MEDICARE

## 2023-02-08 PROCEDURE — 97110 THERAPEUTIC EXERCISES: CPT

## 2023-02-08 PROCEDURE — 97140 MANUAL THERAPY 1/> REGIONS: CPT

## 2023-02-15 ENCOUNTER — APPOINTMENT (OUTPATIENT)
Dept: PHYSICAL THERAPY | Age: 68
End: 2023-02-15
Attending: FAMILY MEDICINE
Payer: MEDICARE

## 2023-02-15 ENCOUNTER — OFFICE VISIT (OUTPATIENT)
Dept: PHYSICAL THERAPY | Age: 68
End: 2023-02-15
Attending: FAMILY MEDICINE
Payer: MEDICARE

## 2023-02-15 PROCEDURE — 97110 THERAPEUTIC EXERCISES: CPT

## 2023-02-15 PROCEDURE — 97140 MANUAL THERAPY 1/> REGIONS: CPT

## 2023-03-13 ENCOUNTER — APPOINTMENT (OUTPATIENT)
Dept: PHYSICAL THERAPY | Age: 68
End: 2023-03-13
Attending: FAMILY MEDICINE
Payer: MEDICARE

## 2023-03-16 ENCOUNTER — OFFICE VISIT (OUTPATIENT)
Dept: PHYSICAL THERAPY | Age: 68
End: 2023-03-16
Attending: FAMILY MEDICINE
Payer: MEDICARE

## 2023-03-16 PROCEDURE — 97110 THERAPEUTIC EXERCISES: CPT

## 2023-04-16 ENCOUNTER — PATIENT MESSAGE (OUTPATIENT)
Dept: SURGERY | Facility: CLINIC | Age: 68
End: 2023-04-16

## 2023-04-16 DIAGNOSIS — C61 PROSTATE CANCER (HCC): Primary | ICD-10-CM

## 2023-04-17 NOTE — TELEPHONE ENCOUNTER
From: Arabella Show  To: Salty Tapia MD  Sent: 4/16/2023 4:40 PM CDT  Subject: PSA test request    I have an appointment on Tuesday April 25th at 8 am and need to complete a series of blood tests for another doctor. Would you like for a PSA test to be included with this blood work? I would like to have one. The last one was Jan 2023. If so, please send me the request and I will get it added to the labs I have having done early this week at [address] [place] [address]. Lab work request by Primary Doctor is attached.   Sincerely,  Emily Eid  [phone]

## 2023-04-19 LAB — PSA, TOTAL: 0.06 NG/ML

## 2023-04-25 ENCOUNTER — OFFICE VISIT (OUTPATIENT)
Dept: SURGERY | Facility: CLINIC | Age: 68
End: 2023-04-25

## 2023-04-25 DIAGNOSIS — N28.1 RENAL CYST: ICD-10-CM

## 2023-04-25 DIAGNOSIS — N52.9 ERECTILE DYSFUNCTION, UNSPECIFIED ERECTILE DYSFUNCTION TYPE: ICD-10-CM

## 2023-04-25 DIAGNOSIS — C61 PROSTATE CANCER (HCC): Primary | ICD-10-CM

## 2023-04-25 DIAGNOSIS — E29.1 HYPOGONADISM IN MALE: ICD-10-CM

## 2023-04-25 LAB
APPEARANCE: CLEAR
BILIRUBIN: NEGATIVE
GLUCOSE (URINE DIPSTICK): NEGATIVE MG/DL
KETONES (URINE DIPSTICK): NEGATIVE MG/DL
LEUKOCYTES: NEGATIVE
MULTISTIX LOT#: NORMAL NUMERIC
NITRITE, URINE: NEGATIVE
OCCULT BLOOD: NEGATIVE
PH, URINE: 6.5 (ref 4.5–8)
SPECIFIC GRAVITY: 1.02 (ref 1–1.03)
URINE-COLOR: YELLOW
UROBILINOGEN,SEMI-QN: 0.2 MG/DL (ref 0–1.9)

## 2023-04-25 PROCEDURE — 81003 URINALYSIS AUTO W/O SCOPE: CPT | Performed by: SURGERY

## 2023-04-25 PROCEDURE — 99215 OFFICE O/P EST HI 40 MIN: CPT | Performed by: SURGERY

## 2023-04-25 RX ORDER — VARDENAFIL HYDROCHLORIDE 20 MG/1
20 TABLET ORAL AS DIRECTED
COMMUNITY
Start: 2023-01-17

## 2023-05-17 ENCOUNTER — HOSPITAL ENCOUNTER (OUTPATIENT)
Dept: ULTRASOUND IMAGING | Facility: HOSPITAL | Age: 68
Discharge: HOME | End: 2023-05-17
Attending: FAMILY MEDICINE
Payer: MEDICARE

## 2023-05-17 DIAGNOSIS — R73.03 PREDIABETES: ICD-10-CM

## 2023-05-17 DIAGNOSIS — N20.0 NEPHROLITHIASIS: ICD-10-CM

## 2023-05-17 DIAGNOSIS — R17 ELEVATED BILIRUBIN: ICD-10-CM

## 2023-05-17 DIAGNOSIS — E66.01 MORBID OBESITY (HCC): ICD-10-CM

## 2023-05-17 DIAGNOSIS — R10.11 RIGHT UPPER QUADRANT PAIN: ICD-10-CM

## 2023-05-17 DIAGNOSIS — R10.811 RIGHT UPPER QUADRANT ABDOMINAL TENDERNESS, REBOUND TENDERNESS PRESENCE NOT SPECIFIED: ICD-10-CM

## 2023-05-17 PROCEDURE — 76700 US EXAM ABDOM COMPLETE: CPT | Performed by: FAMILY MEDICINE

## 2023-05-18 ENCOUNTER — LAB ENCOUNTER (OUTPATIENT)
Dept: LAB | Age: 68
End: 2023-05-18
Attending: FAMILY MEDICINE
Payer: MEDICARE

## 2023-05-18 DIAGNOSIS — R10.11 RIGHT UPPER QUADRANT PAIN: ICD-10-CM

## 2023-05-18 DIAGNOSIS — R79.82 ELEVATED C-REACTIVE PROTEIN (CRP): ICD-10-CM

## 2023-05-18 DIAGNOSIS — K80.81 BILIARY CALCULUS OF OTHER SITE WITH OBSTRUCTION: Primary | ICD-10-CM

## 2023-05-18 LAB
ALBUMIN SERPL-MCNC: 3.9 G/DL (ref 3.4–5)
ALBUMIN/GLOB SERPL: 1.1 {RATIO} (ref 1–2)
ALP LIVER SERPL-CCNC: 66 U/L
ALT SERPL-CCNC: 40 U/L
AMYLASE SERPL-CCNC: 31 U/L (ref 25–115)
ANION GAP SERPL CALC-SCNC: 5 MMOL/L (ref 0–18)
AST SERPL-CCNC: 27 U/L (ref 15–37)
BASOPHILS # BLD AUTO: 0.06 X10(3) UL (ref 0–0.2)
BASOPHILS NFR BLD AUTO: 1 %
BILIRUB SERPL-MCNC: 1.6 MG/DL (ref 0.1–2)
BUN BLD-MCNC: 21 MG/DL (ref 7–18)
CALCIUM BLD-MCNC: 8.8 MG/DL (ref 8.5–10.1)
CHLORIDE SERPL-SCNC: 107 MMOL/L (ref 98–112)
CO2 SERPL-SCNC: 26 MMOL/L (ref 21–32)
CREAT BLD-MCNC: 0.85 MG/DL
CRP SERPL-MCNC: 0.92 MG/DL (ref ?–0.3)
EOSINOPHIL # BLD AUTO: 0.19 X10(3) UL (ref 0–0.7)
EOSINOPHIL NFR BLD AUTO: 3.1 %
ERYTHROCYTE [DISTWIDTH] IN BLOOD BY AUTOMATED COUNT: 13.8 %
FASTING STATUS PATIENT QL REPORTED: NO
GFR SERPLBLD BASED ON 1.73 SQ M-ARVRAT: 95 ML/MIN/1.73M2 (ref 60–?)
GLOBULIN PLAS-MCNC: 3.4 G/DL (ref 2.8–4.4)
GLUCOSE BLD-MCNC: 91 MG/DL (ref 70–99)
HCT VFR BLD AUTO: 44.5 %
HGB BLD-MCNC: 14.2 G/DL
IMM GRANULOCYTES # BLD AUTO: 0.02 X10(3) UL (ref 0–1)
IMM GRANULOCYTES NFR BLD: 0.3 %
LIPASE SERPL-CCNC: 25 U/L (ref 13–75)
LYMPHOCYTES # BLD AUTO: 1.67 X10(3) UL (ref 1–4)
LYMPHOCYTES NFR BLD AUTO: 27.6 %
MCH RBC QN AUTO: 29.6 PG (ref 26–34)
MCHC RBC AUTO-ENTMCNC: 31.9 G/DL (ref 31–37)
MCV RBC AUTO: 92.9 FL
MONOCYTES # BLD AUTO: 0.58 X10(3) UL (ref 0.1–1)
MONOCYTES NFR BLD AUTO: 9.6 %
NEUTROPHILS # BLD AUTO: 3.52 X10 (3) UL (ref 1.5–7.7)
NEUTROPHILS # BLD AUTO: 3.52 X10(3) UL (ref 1.5–7.7)
NEUTROPHILS NFR BLD AUTO: 58.4 %
OSMOLALITY SERPL CALC.SUM OF ELEC: 289 MOSM/KG (ref 275–295)
PLATELET # BLD AUTO: 218 10(3)UL (ref 150–450)
POTASSIUM SERPL-SCNC: 3.9 MMOL/L (ref 3.5–5.1)
PROT SERPL-MCNC: 7.3 G/DL (ref 6.4–8.2)
RBC # BLD AUTO: 4.79 X10(6)UL
SODIUM SERPL-SCNC: 138 MMOL/L (ref 136–145)
WBC # BLD AUTO: 6 X10(3) UL (ref 4–11)

## 2023-05-18 PROCEDURE — 86140 C-REACTIVE PROTEIN: CPT

## 2023-05-18 PROCEDURE — 80053 COMPREHEN METABOLIC PANEL: CPT

## 2023-05-18 PROCEDURE — 36415 COLL VENOUS BLD VENIPUNCTURE: CPT

## 2023-05-18 PROCEDURE — 83690 ASSAY OF LIPASE: CPT

## 2023-05-18 PROCEDURE — 82150 ASSAY OF AMYLASE: CPT

## 2023-05-18 PROCEDURE — 85025 COMPLETE CBC W/AUTO DIFF WBC: CPT

## 2023-05-24 ENCOUNTER — OFFICE VISIT (OUTPATIENT)
Facility: LOCATION | Age: 68
End: 2023-05-24
Payer: MEDICARE

## 2023-05-24 VITALS — HEART RATE: 64 BPM | TEMPERATURE: 98 F

## 2023-05-24 DIAGNOSIS — K80.20 GALLSTONES: Primary | ICD-10-CM

## 2023-05-24 PROCEDURE — 99204 OFFICE O/P NEW MOD 45 MIN: CPT | Performed by: SURGERY

## 2023-07-21 NOTE — IMAGING NOTE
Left message with call back number regarding instructions given in preparation for gated study procedure:  Check-in in the [address] side out-patient registration at [facility]. Hold caffeine, decaf drink  and chocolate 12 hours prior.  TIM

## 2023-07-25 ENCOUNTER — HOSPITAL ENCOUNTER (OUTPATIENT)
Dept: MRI IMAGING | Facility: HOSPITAL | Age: 68
Discharge: HOME | End: 2023-07-25
Attending: SURGERY
Payer: MEDICARE

## 2023-07-25 ENCOUNTER — HOSPITAL ENCOUNTER (OUTPATIENT)
Dept: CT IMAGING | Facility: HOSPITAL | Age: 68
Discharge: HOME | End: 2023-07-25
Attending: INTERNAL MEDICINE
Payer: MEDICARE

## 2023-07-25 VITALS — DIASTOLIC BLOOD PRESSURE: 83 MMHG | SYSTOLIC BLOOD PRESSURE: 94 MMHG | HEART RATE: 61 BPM

## 2023-07-25 DIAGNOSIS — I71.20 THORACIC AORTIC ANEURYSM (TAA) (HCC): ICD-10-CM

## 2023-07-25 DIAGNOSIS — N28.1 RENAL CYST: ICD-10-CM

## 2023-07-25 DIAGNOSIS — I77.810 DILATATION OF THORACIC AORTA (HCC): ICD-10-CM

## 2023-07-25 LAB
CREAT BLD-MCNC: 0.9 MG/DL
EGFRCR SERPLBLD CKD-EPI 2021: 93 ML/MIN/1.73M2 (ref 60–?)

## 2023-07-25 PROCEDURE — 71275 CT ANGIOGRAPHY CHEST: CPT | Performed by: INTERNAL MEDICINE

## 2023-07-25 PROCEDURE — 82565 ASSAY OF CREATININE: CPT

## 2023-07-25 PROCEDURE — 74183 MRI ABD W/O CNTR FLWD CNTR: CPT | Performed by: SURGERY

## 2023-07-25 PROCEDURE — A9575 INJ GADOTERATE MEGLUMI 0.1ML: HCPCS | Performed by: SURGERY

## 2023-07-25 RX ORDER — GADOTERATE MEGLUMINE 376.9 MG/ML
20 INJECTION INTRAVENOUS
Status: COMPLETED | OUTPATIENT
Start: 2023-07-25 | End: 2023-07-25

## 2023-07-25 RX ADMIN — GADOTERATE MEGLUMINE 20 ML: 376.9 INJECTION INTRAVENOUS at 09:42:00

## 2023-07-25 NOTE — IMAGING NOTE
Pt arrives to room CT 4 at 08:21 AM. Working with CT tech [employer]. IV established by Anita Farrar to right [facility] with 20 gauge angiocath. Pt positioned on CT table comfortably. Procedure explained and questions answered. VSS as noted in flowsheet. GFR = 93   imaging started at 8:29 AM    0.9NS flush followed by Omnipaque contrast at 8:33 AM    omnipaque contrast = 90 mL  0.9NS = 75 mL  Average HR = 55    Pt tolerated procedure without complication. Denies s/sx of contrast reaction. IV remains in place for MRI study. Pt A/O x 4 and denies pain. Ambulatory and in stable condition.  Dc'd to MRI at 8:37 AM.

## 2023-08-29 ENCOUNTER — OFFICE VISIT (OUTPATIENT)
Dept: SURGERY | Facility: CLINIC | Age: 68
End: 2023-08-29

## 2023-08-29 DIAGNOSIS — C61 PROSTATE CANCER (HCC): Primary | ICD-10-CM

## 2023-08-29 DIAGNOSIS — N28.1 RENAL CYST: ICD-10-CM

## 2023-08-29 DIAGNOSIS — R68.82 LOW LIBIDO: ICD-10-CM

## 2023-08-29 LAB
APPEARANCE: CLEAR
BILIRUBIN: NEGATIVE
GLUCOSE (URINE DIPSTICK): NEGATIVE MG/DL
KETONES (URINE DIPSTICK): NEGATIVE MG/DL
LEUKOCYTES: NEGATIVE
MULTISTIX LOT#: ABNORMAL NUMERIC
NITRITE, URINE: NEGATIVE
OCCULT BLOOD: NEGATIVE
PH, URINE: 6 (ref 4.5–8)
PROTEIN (URINE DIPSTICK): 30 MG/DL
SPECIFIC GRAVITY: 1.03 (ref 1–1.03)
URINE-COLOR: YELLOW
UROBILINOGEN,SEMI-QN: 0.2 MG/DL (ref 0–1.9)

## 2023-08-29 PROCEDURE — 99214 OFFICE O/P EST MOD 30 MIN: CPT | Performed by: SURGERY

## 2023-08-29 PROCEDURE — 81003 URINALYSIS AUTO W/O SCOPE: CPT | Performed by: SURGERY

## 2023-08-29 RX ORDER — TERBINAFINE HYDROCHLORIDE 250 MG/1
TABLET ORAL
COMMUNITY
Start: 2023-08-28

## 2023-08-29 RX ORDER — AMOXICILLIN AND CLAVULANATE POTASSIUM 875; 125 MG/1; MG/1
1 TABLET, FILM COATED ORAL 2 TIMES DAILY WITH MEALS
COMMUNITY
Start: 2023-05-18

## 2023-09-08 LAB — PSA, TOTAL: 0.09 NG/ML

## 2023-09-18 ENCOUNTER — TELEMEDICINE (OUTPATIENT)
Dept: SURGERY | Facility: CLINIC | Age: 68
End: 2023-09-18

## 2023-09-18 DIAGNOSIS — C61 PROSTATE CANCER (HCC): Primary | ICD-10-CM

## 2023-09-18 PROCEDURE — 99214 OFFICE O/P EST MOD 30 MIN: CPT | Performed by: SURGERY

## 2023-10-20 ENCOUNTER — GENETICS ENCOUNTER (OUTPATIENT)
Dept: GENETICS | Facility: HOSPITAL | Age: 68
End: 2023-10-20
Attending: GENETIC COUNSELOR, MS
Payer: MEDICARE

## 2023-10-20 ENCOUNTER — NURSE ONLY (OUTPATIENT)
Dept: HEMATOLOGY/ONCOLOGY | Facility: HOSPITAL | Age: 68
End: 2023-10-20
Attending: GENETIC COUNSELOR, MS
Payer: MEDICARE

## 2023-10-20 DIAGNOSIS — Z80.51 FAMILY HISTORY OF CANCER OF THE KIDNEY: ICD-10-CM

## 2023-10-20 DIAGNOSIS — Z85.46 PERSONAL HISTORY OF PROSTATE CANCER: Primary | ICD-10-CM

## 2023-10-20 PROCEDURE — 36415 COLL VENOUS BLD VENIPUNCTURE: CPT

## 2023-10-20 PROCEDURE — 96040 HC GENETIC COUNSELING EA 30 MIN: CPT | Performed by: GENETIC COUNSELOR, MS

## 2023-10-20 NOTE — PROGRESS NOTES
Referring Provider:  Avril Polk MD    Additional Provider(s):  Marco Joshi MD    Reason for Referral:  Daiana Mckay"Danuta Calderon was referred for genetic counseling because of a personal and family history of cancer. Mr. Danuta Calderon is a 75year-old man of [place], [facility], and [place] descent who was diagnosed with stage IIb prostate cancer at age 71, [place] score 3+4. He was treated with proton therapy and a short course of ADT. He sees a dermatologist annually. His 22 colonoscopy was negative for polyps; a repeat screening colonoscopy was recommended in 10 years. Social History:  Mr. Danuta Calderon was seen today by himself. He lives in [facility]. Family History:   A three generation pedigree was obtained. Mr. Danuta Calderon has a 36year-old daughter and a 45year-old son. Mr. Danuta Calderon has five grandchildren, ages three to 6. Mr. Danuta Calderon has one younger brother and one younger sister. Mr. Morgan Naylor sister had a hysterectomy in her 45s for what he believes was uterine cancer. Mr. Morgan Naylor brother was diagnosed with renal cancer, histology unspecified, in his early 62s. Mr. Morgan Naylor mother  at age 80 and did not have cancer. Mr. Morgan Naylor mother had one brother and no sisters. Mr. Morgan Naylor maternal uncle  in his late 45s or early 46s from unknown causes. One of Mr. Zaragoza's maternal cousins  in her 46s from lung cancer. Mr. Morgan Naylor maternal grandmother  in her 80s and did not have cancer. Mr. Morgan Naylor maternal grandfather  in his early 62s and did not have cancer. Mr. Morgan Naylor father  at age 71 from metastatic renal cancer. Mr. Morgan Naylor father had one sister and no brothers. Mr. Morgan Naylor paternal grandmother  in her 80s and did not have cancer. Mr. Morgan Naylor paternal grandfather  in his early 76s from an unknown cancer. Incidentally, Mr. Zaragoza's sister, mother, and maternal cousin have retinitis pigmentosa (RP).  Mr. Danuta Calderon has not been diagnosed with RP. Please see the pedigree for additional family history information. Counseling: The following information was discussed with Mr. Lillian Fernandez. Genetics of Cancer: The majority of cancers are sporadic whereas approximately 10-30% of cases of cancer cases are attributed to familial factors such as unidentified low penetrance genes in the family or shared environmental factors. Approximately 5-10% of cancers are related to a hereditary cancer syndrome. Signs of a hereditary cancer syndrome include some rare cancers, common cancers occurring at unusually young ages, multiple primary cancers in the some individual, or the same type of cancer or related cancers (e.g., breast and ovarian, colorectal and endometrial) in three or more individuals in the same lineage. Risk Assessment:   Mr. Jame Lauren reported personal and family histories are somewhat suspicious for a hereditary cancer predisposition syndrome. Approximately 6-12% of men with metastatic prostate cancer, unselected for family history, have inherited mutations in a DNA repair gene. Risk assessment to estimate the chance of Mr. Lillian Fernandez harboring a pathogenic variant associated with Koch syndrome was done by using the [facility] Model. Based on this model, Mr. Jame Lauren risk of carrying a pathogenic variant associated with Koch syndrome is estimated to be 4% based on his reported family history. I recommend that testing be performed as part of a multigene panel. Genetic Testing (Panel): The pros, cons, and limitations of genetic testing were discussed including the potential implications of test results on clinical management. If a pathogenic variant is not identified (negative result), it is still possible that Mr. Lillian Fernandez has a pathogenic variant in one of these genes that was not detected by the genetic test, or that the family is dealing with a hereditary cancer syndrome involving a different gene. It is also possible that Mr. Zaragoza's relatives have a pathogenic variant in one of these genes that Mr. Jennifer Castro did not inherit. In this scenario, options for cancer screening/management should be determined according to personal and family histories and should be discussed with a physician. A variant of uncertain significance is a DNA change that may or may not alter the function of the gene; therefore, it is usually not possible to determine if the gene variant is responsible for an individual's increased cancer risk. If Mr. Jennifer Castro is found to carry a pathogenic variant in a cancer predisposition gene, he is at significantly increased risk for various cancers. The magnitude of these risks, and the cancers for which he is at increased risk would depend on the gene involved. It was also explained that for some of the genes for which testing is available, the associated cancer risks have yet to be determined and medical management recommendations may not yet be available for individuals with pathogenic variants in these genes. Genetic test results have implications for the entire biological family. Thus, it is recommended that he share his genetic test results with his biological family members so that they may have their risk assessed. Genetic Information Non-Discrimination Act: The legal protections of the Genetic Information Nondiscrimination Act (ALEX) for health insurance and employment were discussed. ALEX does not provide protection for life insurance, disability or long-term care insurance. Summary and Plan:  Mr. Jennifer Castro was referred for genetic counseling because of a personal history of prostate cancer and family history of renal cancer in his brother and father. His reported family history is somewhat suspicious for a hereditary cancer syndrome. Genetic testing on Mr. Jennifer Castro for hereditary cancer predisposition susceptibility genes is indicated based on his personal and reported family history of cancer.        At the conclusion of the counseling session Mr. Joseph Cobb decided to proceed with genetic testing. Written consent was obtained. Blood and paperwork were sent to [facility] for their Common Hereditary Cancers panel + Renal/Urinary Tract Cancers panels. I anticipate that Mr. Zaragoza's results will be available within 2-3 weeks and will call him with the results. Results will also be communicated to Dr. Stoney Storm and Dr. Consuelo Fleming. Approximately 40 minutes were spent in consultation with Mr. Joseph Cobb.

## 2023-11-09 ENCOUNTER — GENETICS ENCOUNTER (OUTPATIENT)
Dept: HEMATOLOGY/ONCOLOGY | Facility: HOSPITAL | Age: 68
End: 2023-11-09

## 2023-11-09 ENCOUNTER — TELEPHONE (OUTPATIENT)
Dept: GENETICS | Facility: HOSPITAL | Age: 68
End: 2023-11-09

## 2023-11-09 NOTE — PROGRESS NOTES
Referring Provider:                    Korina Piper MD     Additional Provider(s):              Ben Mistry MD    Reason for Referral:  Sandro Biswas had genetic testing performed on 10/20/23 because of a personal history of prostate cancer and a family history of cancer. Genetic Testing Result:  No known pathogenic variants were found in 58 genes including: APC*, TALIA*, AXIN2, BAP1, BARD1, BMPR1A, BRCA1, BRCA2, BRIP1, CDC73, CDH1, CDK4, CDKN1C, CDKN2A (p14ARF), CDKN2A ([id number]), CHEK2, CTNNA1, DICER1*, DIS3L2, EPCAM*, FH*, FLCN, GPC3*, GREM1*, HOXB13, KIT, MEN1*, MET*, MLH1*, MSH2*, MSH3*, MSH6*, MUTYH, NBN, NF1*, NTHL1, PALB2, PDGFRA, PMS2*, POLD1*, POLE, PTEN*, RAD50, RAD51C, RAD51D, REST, SDHA*, SDHB, SDHC*, SDHD, SMAD4, SMARCA4, SMARCB1, STK11, TP53, TSC1*, TSC2, VHL, WT1. Please refer to the report from [facility] for additional testing information. These results were discussed with Mr. Leslie Vidales by phone on 11/09/23. Summary and Plan:  These results indicate that it is unlikely that Mr. Leslie Vidales has a pathogenic variant in any of the genes listed above. The limitations of the testing include the chance that a pathogenic variant in a gene other than those included in this analysis might be the cause of cancer in Mr. Leslie Vidales or his relatives. I encourage Mr. Leslie Vidales to contact me on an annual basis to see if there have been any updates in genetic testing that would apply to him or to inform me if there are any changes to the family history. In the meantime, Mr. Leslie Vidales and his relatives should speak with their physicians to discuss recommended medical management according to their personal and family history.     Cc:  Sandro Biswas

## 2023-11-09 NOTE — TELEPHONE ENCOUNTER
Patient is returning your call Maricruz to discuss his Genetic Test Results. The patient is available and waiting for your call.

## 2024-01-30 ENCOUNTER — TELEPHONE (OUTPATIENT)
Dept: SURGERY | Facility: CLINIC | Age: 69
End: 2024-01-30

## 2024-01-30 NOTE — TELEPHONE ENCOUNTER
Outside hospital labs from 1/3/2024 reviewed.    Creatinine 0.74  A1c 5.7  Testosterone 284  PSA 0.14    Labs are stable from prior.    No change to plan from prior, will see him in about 6 months and repeat labs again at that time.

## 2024-04-07 ENCOUNTER — HOSPITAL ENCOUNTER (EMERGENCY)
Facility: HOSPITAL | Age: 69
Discharge: HOME | End: 2024-04-08
Attending: EMERGENCY MEDICINE
Payer: MEDICARE

## 2024-04-07 ENCOUNTER — APPOINTMENT (OUTPATIENT)
Dept: CT IMAGING | Facility: HOSPITAL | Age: 69
End: 2024-04-07
Attending: EMERGENCY MEDICINE
Payer: MEDICARE

## 2024-04-07 DIAGNOSIS — K35.80 ACUTE APPENDICITIS, UNSPECIFIED ACUTE APPENDICITIS TYPE: Primary | ICD-10-CM

## 2024-04-07 DIAGNOSIS — K37 APPENDICITIS: ICD-10-CM

## 2024-04-07 LAB
ALBUMIN SERPL-MCNC: 4.3 G/DL (ref 3.4–5)
ALBUMIN/GLOB SERPL: 1.3 {RATIO} (ref 1–2)
ALP LIVER SERPL-CCNC: 67 U/L
ALT SERPL-CCNC: 35 U/L
ANION GAP SERPL CALC-SCNC: 6 MMOL/L (ref 0–18)
AST SERPL-CCNC: 19 U/L (ref 15–37)
BASOPHILS # BLD AUTO: 0.06 X10(3) UL (ref 0–0.2)
BASOPHILS NFR BLD AUTO: 0.4 %
BILIRUB SERPL-MCNC: 2.2 MG/DL (ref 0.1–2)
BILIRUB UR QL STRIP.AUTO: NEGATIVE
BUN BLD-MCNC: 12 MG/DL (ref 9–23)
CALCIUM BLD-MCNC: 9.2 MG/DL (ref 8.5–10.1)
CHLORIDE SERPL-SCNC: 106 MMOL/L (ref 98–112)
CLARITY UR REFRACT.AUTO: CLEAR
CO2 SERPL-SCNC: 26 MMOL/L (ref 21–32)
CREAT BLD-MCNC: 0.83 MG/DL
EGFRCR SERPLBLD CKD-EPI 2021: 95 ML/MIN/1.73M2 (ref 60–?)
EOSINOPHIL # BLD AUTO: 0.05 X10(3) UL (ref 0–0.7)
EOSINOPHIL NFR BLD AUTO: 0.3 %
ERYTHROCYTE [DISTWIDTH] IN BLOOD BY AUTOMATED COUNT: 13.2 %
GLOBULIN PLAS-MCNC: 3.2 G/DL (ref 2.8–4.4)
GLUCOSE BLD-MCNC: 115 MG/DL (ref 70–99)
GLUCOSE UR STRIP.AUTO-MCNC: NORMAL MG/DL
HCT VFR BLD AUTO: 44.6 %
HGB BLD-MCNC: 15.5 G/DL
IMM GRANULOCYTES # BLD AUTO: 0.06 X10(3) UL (ref 0–1)
IMM GRANULOCYTES NFR BLD: 0.4 %
KETONES UR STRIP.AUTO-MCNC: 20 MG/DL
LEUKOCYTE ESTERASE UR QL STRIP.AUTO: NEGATIVE
LIPASE SERPL-CCNC: 21 U/L (ref 13–75)
LYMPHOCYTES # BLD AUTO: 0.9 X10(3) UL (ref 1–4)
LYMPHOCYTES NFR BLD AUTO: 6.2 %
MCH RBC QN AUTO: 30.6 PG (ref 26–34)
MCHC RBC AUTO-ENTMCNC: 34.8 G/DL (ref 31–37)
MCV RBC AUTO: 88.1 FL
MONOCYTES # BLD AUTO: 0.65 X10(3) UL (ref 0.1–1)
MONOCYTES NFR BLD AUTO: 4.5 %
NEUTROPHILS # BLD AUTO: 12.85 X10 (3) UL (ref 1.5–7.7)
NEUTROPHILS # BLD AUTO: 12.85 X10(3) UL (ref 1.5–7.7)
NEUTROPHILS NFR BLD AUTO: 88.2 %
NITRITE UR QL STRIP.AUTO: NEGATIVE
OSMOLALITY SERPL CALC.SUM OF ELEC: 287 MOSM/KG (ref 275–295)
PH UR STRIP.AUTO: 6 [PH] (ref 5–8)
PLATELET # BLD AUTO: 205 10(3)UL (ref 150–450)
POTASSIUM SERPL-SCNC: 3.8 MMOL/L (ref 3.5–5.1)
PROT SERPL-MCNC: 7.5 G/DL (ref 6.4–8.2)
PROT UR STRIP.AUTO-MCNC: NEGATIVE MG/DL
RBC # BLD AUTO: 5.06 X10(6)UL
RBC UR QL AUTO: NEGATIVE
SODIUM SERPL-SCNC: 138 MMOL/L (ref 136–145)
SP GR UR STRIP.AUTO: 1.02 (ref 1–1.03)
UROBILINOGEN UR STRIP.AUTO-MCNC: NORMAL MG/DL
WBC # BLD AUTO: 14.6 X10(3) UL (ref 4–11)

## 2024-04-07 PROCEDURE — 99284 EMERGENCY DEPT VISIT MOD MDM: CPT | Performed by: STUDENT IN AN ORGANIZED HEALTH CARE EDUCATION/TRAINING PROGRAM

## 2024-04-07 PROCEDURE — 74177 CT ABD & PELVIS W/CONTRAST: CPT | Performed by: EMERGENCY MEDICINE

## 2024-04-07 RX ORDER — MORPHINE SULFATE 2 MG/ML
1 INJECTION, SOLUTION INTRAMUSCULAR; INTRAVENOUS EVERY 2 HOUR PRN
OUTPATIENT
Start: 2024-04-07

## 2024-04-07 RX ORDER — MORPHINE SULFATE 2 MG/ML
2 INJECTION, SOLUTION INTRAMUSCULAR; INTRAVENOUS EVERY 2 HOUR PRN
OUTPATIENT
Start: 2024-04-07

## 2024-04-07 RX ORDER — ACETAMINOPHEN 500 MG
1000 TABLET ORAL EVERY 8 HOURS PRN
OUTPATIENT
Start: 2024-04-07

## 2024-04-07 RX ORDER — SENNOSIDES 8.6 MG
17.2 TABLET ORAL NIGHTLY PRN
OUTPATIENT
Start: 2024-04-07

## 2024-04-07 RX ORDER — NICOTINE POLACRILEX 4 MG
30 LOZENGE BUCCAL
OUTPATIENT
Start: 2024-04-07

## 2024-04-07 RX ORDER — MORPHINE SULFATE 4 MG/ML
4 INJECTION, SOLUTION INTRAMUSCULAR; INTRAVENOUS EVERY 2 HOUR PRN
OUTPATIENT
Start: 2024-04-07

## 2024-04-07 RX ORDER — ECHINACEA PURPUREA EXTRACT 125 MG
1 TABLET ORAL
OUTPATIENT
Start: 2024-04-07

## 2024-04-07 RX ORDER — SODIUM CHLORIDE 9 MG/ML
INJECTION, SOLUTION INTRAVENOUS CONTINUOUS
Status: DISCONTINUED | OUTPATIENT
Start: 2024-04-07 | End: 2024-04-08

## 2024-04-07 RX ORDER — METOCLOPRAMIDE HYDROCHLORIDE 5 MG/ML
10 INJECTION INTRAMUSCULAR; INTRAVENOUS EVERY 8 HOURS PRN
OUTPATIENT
Start: 2024-04-07

## 2024-04-07 RX ORDER — CALCIUM CARBONATE 500 MG/1
500 TABLET, CHEWABLE ORAL 3 TIMES DAILY PRN
OUTPATIENT
Start: 2024-04-07

## 2024-04-07 RX ORDER — ONDANSETRON 2 MG/ML
4 INJECTION INTRAMUSCULAR; INTRAVENOUS EVERY 6 HOURS PRN
OUTPATIENT
Start: 2024-04-07

## 2024-04-07 RX ORDER — ONDANSETRON 2 MG/ML
4 INJECTION INTRAMUSCULAR; INTRAVENOUS ONCE
Status: COMPLETED | OUTPATIENT
Start: 2024-04-07 | End: 2024-04-07

## 2024-04-07 RX ORDER — HYDROMORPHONE HYDROCHLORIDE 1 MG/ML
0.5 INJECTION, SOLUTION INTRAMUSCULAR; INTRAVENOUS; SUBCUTANEOUS ONCE
Status: COMPLETED | OUTPATIENT
Start: 2024-04-07 | End: 2024-04-07

## 2024-04-07 RX ORDER — HYDRALAZINE HYDROCHLORIDE 20 MG/ML
5 INJECTION INTRAMUSCULAR; INTRAVENOUS EVERY 6 HOURS PRN
OUTPATIENT
Start: 2024-04-07

## 2024-04-07 RX ORDER — SODIUM CHLORIDE 9 MG/ML
INJECTION, SOLUTION INTRAVENOUS CONTINUOUS
OUTPATIENT
Start: 2024-04-07

## 2024-04-07 RX ORDER — BISACODYL 10 MG
10 SUPPOSITORY, RECTAL RECTAL
OUTPATIENT
Start: 2024-04-07

## 2024-04-07 RX ORDER — POLYETHYLENE GLYCOL 3350 17 G/17G
17 POWDER, FOR SOLUTION ORAL DAILY PRN
OUTPATIENT
Start: 2024-04-07

## 2024-04-07 RX ORDER — NICOTINE POLACRILEX 4 MG
15 LOZENGE BUCCAL
OUTPATIENT
Start: 2024-04-07

## 2024-04-07 RX ORDER — DEXTROSE MONOHYDRATE 25 G/50ML
50 INJECTION, SOLUTION INTRAVENOUS
OUTPATIENT
Start: 2024-04-07

## 2024-04-07 RX ORDER — ENEMA 19; 7 G/133ML; G/133ML
1 ENEMA RECTAL ONCE AS NEEDED
OUTPATIENT
Start: 2024-04-07

## 2024-04-07 RX ORDER — ENOXAPARIN SODIUM 100 MG/ML
40 INJECTION SUBCUTANEOUS DAILY
OUTPATIENT
Start: 2024-04-08

## 2024-04-07 NOTE — ED PROVIDER NOTES
Patient Seen in: [facility] Emergency Department      History     Chief Complaint   Patient presents with    Abdomen/Flank Pain     Stated Complaint: right lower abd pain, radiates to back    Subjective:   HPI    68-year-old male presents emergency room with chief complaint of right lower back/abdominal pain.  Symptoms started on Friday night, and has been intermittent, worsened today.  Pain is now in the right lower abdomen, states he cannot find a position of comfort has had nausea and did vomit.  Denies diarrhea or constipation.  Denies melena or hematochezia.  Denies urinary symptoms.  Denies any fevers or chills.  Denies chest pain or shortness of breath.  Denies any tearing type chest or abdominal pain.  Denies any trauma.    Objective:   Past Medical History:   Diagnosis Date    Arthritis 12/2018    Back pain 6/2018    Disorder of prostate 9/2020    Disorder of thyroid     Hearing impairment     Bilateral hearing aids    Hearing loss 9/1974    wear hearing aids    Hemorrhoids 12/2020    Hx of motion sickness     Hyperlipidemia     Hypothyroid     Morbid obesity (HCC)     SKY (obstructive sleep apnea)     CPAP 12    SKY (obstructive sleep apnea) 2/22/2020 [facility] Split Night    AHI 71 Supine  non-supine AHI 50 Sao2 Ricardo 85%    Osteoarthritis     Pain in joints 6/2020    Presence of other cardiac implants and grafts 12/2005    Prostate cancer (HCC)     Sleep apnea     cpap    Wears glasses 1/2009              Past Surgical History:   Procedure Laterality Date    COLONOSCOPY N/A 3/1/2022    Procedure: COLONOSCOPY;  Surgeon: Fransico Johnson MD;  Location:  ENDOSCOPY    KNEE REPLACEMENT SURGERY Left 12/2005    LAPAROSCOPY, SURGICAL PROSTATECTOMY, RETROPUBIC RADICAL, W/NERVE SPARING  12/2020    Proton Therapy    TONSILLECTOMY      T&A                Social History     Socioeconomic History    Marital status:    Tobacco Use    Smoking status: Never    Smokeless tobacco: Never   Vaping Use     Vaping Use: Never used   Substance and Sexual Activity    Alcohol use: Yes     Alcohol/week: 6.0 standard drinks of alcohol     Types: 1 Glasses of wine, 2 Cans of beer, 3 Standard drinks or equivalent per week     Comment: social    Drug use: No   Other Topics Concern    Caffeine Concern Yes     Comment: 2 cup daily coffee    Exercise Yes     Comment: yoga              Review of Systems    Positive for stated complaint: right lower abd pain, radiates to back  Other systems are as noted in HPI.  Constitutional and vital signs reviewed.      All other systems reviewed and negative except as noted above.    Physical Exam     ED Triage Vitals [04/07/24 1740]   /87   Pulse 76   Resp 16   Temp 98 °F (36.7 °C)   Temp src Oral   SpO2 98 %   O2 Device None (Room air)       Current:/86   Pulse 83   Temp 98 °F (36.7 °C) (Oral)   Resp 15   Ht 182.9 cm (6')   Wt (!) 145.2 kg   SpO2 97%   BMI 43.40 kg/m²         Physical Exam    GENERAL: Patient is awake, alert, in no acute distress.  HEENT:  no scleral icterus.  Mucous membranes are moist  HEART: Regular rate and rhythm, no murmurs.  LUNGS: Clear to auscultation bilaterally.  No Rales, no rhonchi, no wheezing, no stridor.  ABDOMEN: Soft, nondistended, right lower quadrant tender, bowel sounds are present, no rebound, no rigidity, no guarding.no pulsatile masses. No CVA tenderness  EXTREMITIES: No peripheral edema,    ED Course     Labs Reviewed   COMP METABOLIC PANEL (14) - Abnormal; Notable for the following components:       Result Value    Glucose 115 (*)     Bilirubin, Total 2.2 (*)     All other components within normal limits   URINALYSIS, ROUTINE - Abnormal; Notable for the following components:    Ketones Urine 20 (*)     All other components within normal limits   CBC W/ DIFFERENTIAL - Abnormal; Notable for the following components:    WBC 14.6 (*)     Neutrophil Absolute Prelim 12.85 (*)     Neutrophil Absolute 12.85 (*)     Lymphocyte Absolute 0.90  (*)     All other components within normal limits   LIPASE - Normal   CBC WITH DIFFERENTIAL WITH PLATELET    Narrative:     The following orders were created for panel order CBC With Differential With Platelet.  Procedure                               Abnormality         Status                     ---------                               -----------         ------                     CBC W/ DIFFERENTIAL[[id number]]          Abnormal            Final result                 Please view results for these tests on the individual orders.                      MDM        Differential diagnosis before testing includes but not limited to appendicitis, diverticulitis, nephrolithiasis/urolithiasis, perforated viscus, bowel obstruction, cholecystitis, which is a medical condition that poses a threat to life/function    Radiographic images  I personally reviewed the radiographs and my individual interpretation shows CT abdomen no free air  I also reviewed the official reports that showed CT acute appendicitis no perforation or abscess    Discussion of management (consult/physicians, social work, pharmacy,ect)  surgery Dr. Rodrigues, [facility] Dr. Kaur      Medications Provided: IV normal saline, Zosyn    Course of Events during Emergency Room Visit include established blood work obtained.  CT white count 14.6 hemoglobin 15.5 platelet 205.  Urinalysis negative nitrate leukocyte esterase.  Chemistries unremarkable.  CT performed which did reveal acute appendicitis.  Patient has declined pain medication, discussed with general surgery who recommended dose of IV Zosyn, keep patient NPO.  Discussed with hospitalist admission.  Discussed all results with the patient and family, will admit to the hospitalist for further evaluation and treatment    Shared decision making was utilized           Disposition:    Admission  I have discussed with the patient the results of test, differential diagnosis, and treatment plan. They  expressed clear understanding of these instructions and agrees to the plan provided.       Note to patient: The 21st Century Cures Act makes medical notes like these available to patients in the interest of transparency. However, this is a medical document intended as peer to peer communication. It is written in medical language and may contain abbreviations or verbiage that are unfamiliar. It may appear blunt or direct. Medical documents are intended to carry relevant information, facts as evident, and the clinical opinion of the practitioner.                 Addendum: I was contacted by nursing supervisor who reportedly spoke with general surgeon, patient will be going [facility] the OR [facility], final disposition will be determined after OR                             Medical Decision Making      Disposition and Plan     Clinical Impression:  1. Acute appendicitis, unspecified acute appendicitis type         Disposition:  Send to or/cath/gi  4/7/2024  8:26 pm    Follow-up:  No follow-up provider specified.        Medications Prescribed:  Current Discharge Medication List

## 2024-04-08 ENCOUNTER — TELEPHONE (OUTPATIENT)
Dept: SURGERY | Facility: CLINIC | Age: 69
End: 2024-04-08

## 2024-04-08 ENCOUNTER — ANESTHESIA EVENT (OUTPATIENT)
Dept: SURGERY | Facility: HOSPITAL | Age: 69
End: 2024-04-08
Payer: MEDICARE

## 2024-04-08 ENCOUNTER — ANESTHESIA (OUTPATIENT)
Dept: SURGERY | Facility: HOSPITAL | Age: 69
End: 2024-04-08
Payer: MEDICARE

## 2024-04-08 VITALS
OXYGEN SATURATION: 99 % | DIASTOLIC BLOOD PRESSURE: 79 MMHG | WEIGHT: 315 LBS | RESPIRATION RATE: 14 BRPM | SYSTOLIC BLOOD PRESSURE: 138 MMHG | HEART RATE: 73 BPM | BODY MASS INDEX: 42.66 KG/M2 | HEIGHT: 72 IN | TEMPERATURE: 98 F

## 2024-04-08 PROCEDURE — 44970 LAPAROSCOPY APPENDECTOMY: CPT | Performed by: SURGERY

## 2024-04-08 PROCEDURE — 0DTJ4ZZ RESECTION OF APPENDIX, PERCUTANEOUS ENDOSCOPIC APPROACH: ICD-10-PCS | Performed by: SURGERY

## 2024-04-08 PROCEDURE — 99214 OFFICE O/P EST MOD 30 MIN: CPT | Performed by: SURGERY

## 2024-04-08 RX ORDER — MEPERIDINE HYDROCHLORIDE 25 MG/ML
12.5 INJECTION INTRAMUSCULAR; INTRAVENOUS; SUBCUTANEOUS AS NEEDED
Status: DISCONTINUED | OUTPATIENT
Start: 2024-04-08 | End: 2024-04-08

## 2024-04-08 RX ORDER — HYDROMORPHONE HYDROCHLORIDE 1 MG/ML
0.2 INJECTION, SOLUTION INTRAMUSCULAR; INTRAVENOUS; SUBCUTANEOUS EVERY 2 HOUR PRN
Status: CANCELLED | OUTPATIENT
Start: 2024-04-08

## 2024-04-08 RX ORDER — ENOXAPARIN SODIUM 100 MG/ML
40 INJECTION SUBCUTANEOUS DAILY
Status: CANCELLED | OUTPATIENT
Start: 2024-04-08

## 2024-04-08 RX ORDER — METRONIDAZOLE 500 MG/100ML
INJECTION, SOLUTION INTRAVENOUS AS NEEDED
Status: DISCONTINUED | OUTPATIENT
Start: 2024-04-08 | End: 2024-04-08 | Stop reason: SURG

## 2024-04-08 RX ORDER — ROCURONIUM BROMIDE 10 MG/ML
INJECTION, SOLUTION INTRAVENOUS AS NEEDED
Status: DISCONTINUED | OUTPATIENT
Start: 2024-04-08 | End: 2024-04-08 | Stop reason: SURG

## 2024-04-08 RX ORDER — HYDROCODONE BITARTRATE AND ACETAMINOPHEN 5; 325 MG/1; MG/1
TABLET ORAL
Status: COMPLETED
Start: 2024-04-08 | End: 2024-04-08

## 2024-04-08 RX ORDER — HYDROMORPHONE HYDROCHLORIDE 1 MG/ML
INJECTION, SOLUTION INTRAMUSCULAR; INTRAVENOUS; SUBCUTANEOUS
Status: COMPLETED
Start: 2024-04-08 | End: 2024-04-08

## 2024-04-08 RX ORDER — ACETAMINOPHEN 500 MG
1000 TABLET ORAL ONCE AS NEEDED
Status: COMPLETED | OUTPATIENT
Start: 2024-04-08 | End: 2024-04-08

## 2024-04-08 RX ORDER — SENNOSIDES 8.6 MG
17.2 TABLET ORAL NIGHTLY PRN
Status: CANCELLED | OUTPATIENT
Start: 2024-04-08

## 2024-04-08 RX ORDER — METOCLOPRAMIDE HYDROCHLORIDE 5 MG/ML
10 INJECTION INTRAMUSCULAR; INTRAVENOUS EVERY 8 HOURS PRN
Status: CANCELLED | OUTPATIENT
Start: 2024-04-08

## 2024-04-08 RX ORDER — BISACODYL 10 MG
10 SUPPOSITORY, RECTAL RECTAL
Status: CANCELLED | OUTPATIENT
Start: 2024-04-08

## 2024-04-08 RX ORDER — SODIUM CHLORIDE 9 MG/ML
INJECTION, SOLUTION INTRAVENOUS CONTINUOUS
Status: CANCELLED | OUTPATIENT
Start: 2024-04-08

## 2024-04-08 RX ORDER — HYDROMORPHONE HYDROCHLORIDE 1 MG/ML
0.4 INJECTION, SOLUTION INTRAMUSCULAR; INTRAVENOUS; SUBCUTANEOUS EVERY 2 HOUR PRN
Status: CANCELLED | OUTPATIENT
Start: 2024-04-08

## 2024-04-08 RX ORDER — LABETALOL HYDROCHLORIDE 5 MG/ML
5 INJECTION, SOLUTION INTRAVENOUS EVERY 5 MIN PRN
Status: DISCONTINUED | OUTPATIENT
Start: 2024-04-08 | End: 2024-04-08

## 2024-04-08 RX ORDER — ONDANSETRON 2 MG/ML
4 INJECTION INTRAMUSCULAR; INTRAVENOUS EVERY 6 HOURS PRN
Status: DISCONTINUED | OUTPATIENT
Start: 2024-04-08 | End: 2024-04-08

## 2024-04-08 RX ORDER — OXYCODONE HYDROCHLORIDE 5 MG/1
2.5 TABLET ORAL EVERY 4 HOURS PRN
Status: CANCELLED | OUTPATIENT
Start: 2024-04-08

## 2024-04-08 RX ORDER — POLYETHYLENE GLYCOL 3350 17 G/17G
17 POWDER, FOR SOLUTION ORAL DAILY PRN
Status: CANCELLED | OUTPATIENT
Start: 2024-04-08

## 2024-04-08 RX ORDER — NALOXONE HYDROCHLORIDE 0.4 MG/ML
80 INJECTION, SOLUTION INTRAMUSCULAR; INTRAVENOUS; SUBCUTANEOUS AS NEEDED
Status: DISCONTINUED | OUTPATIENT
Start: 2024-04-08 | End: 2024-04-08

## 2024-04-08 RX ORDER — METOCLOPRAMIDE HYDROCHLORIDE 5 MG/ML
10 INJECTION INTRAMUSCULAR; INTRAVENOUS EVERY 8 HOURS PRN
Status: DISCONTINUED | OUTPATIENT
Start: 2024-04-08 | End: 2024-04-08

## 2024-04-08 RX ORDER — OXYCODONE HYDROCHLORIDE 5 MG/1
5 TABLET ORAL EVERY 6 HOURS PRN
Qty: 5 TABLET | Refills: 0 | Status: SHIPPED | OUTPATIENT
Start: 2024-04-08

## 2024-04-08 RX ORDER — HYDROCODONE BITARTRATE AND ACETAMINOPHEN 5; 325 MG/1; MG/1
2 TABLET ORAL ONCE AS NEEDED
Status: COMPLETED | OUTPATIENT
Start: 2024-04-08 | End: 2024-04-08

## 2024-04-08 RX ORDER — HYDROMORPHONE HYDROCHLORIDE 1 MG/ML
0.6 INJECTION, SOLUTION INTRAMUSCULAR; INTRAVENOUS; SUBCUTANEOUS EVERY 5 MIN PRN
Status: DISCONTINUED | OUTPATIENT
Start: 2024-04-08 | End: 2024-04-08

## 2024-04-08 RX ORDER — ENEMA 19; 7 G/133ML; G/133ML
1 ENEMA RECTAL ONCE AS NEEDED
Status: CANCELLED | OUTPATIENT
Start: 2024-04-08

## 2024-04-08 RX ORDER — IBUPROFEN 600 MG/1
600 TABLET ORAL EVERY 6 HOURS SCHEDULED
Status: CANCELLED | OUTPATIENT
Start: 2024-04-09

## 2024-04-08 RX ORDER — ONDANSETRON 2 MG/ML
INJECTION INTRAMUSCULAR; INTRAVENOUS AS NEEDED
Status: DISCONTINUED | OUTPATIENT
Start: 2024-04-08 | End: 2024-04-08 | Stop reason: SURG

## 2024-04-08 RX ORDER — DEXAMETHASONE SODIUM PHOSPHATE 4 MG/ML
VIAL (ML) INJECTION AS NEEDED
Status: DISCONTINUED | OUTPATIENT
Start: 2024-04-08 | End: 2024-04-08 | Stop reason: SURG

## 2024-04-08 RX ORDER — MIDAZOLAM HYDROCHLORIDE 1 MG/ML
1 INJECTION INTRAMUSCULAR; INTRAVENOUS EVERY 5 MIN PRN
Status: DISCONTINUED | OUTPATIENT
Start: 2024-04-08 | End: 2024-04-08

## 2024-04-08 RX ORDER — BUPIVACAINE HYDROCHLORIDE AND EPINEPHRINE 5; 5 MG/ML; UG/ML
INJECTION, SOLUTION EPIDURAL; INTRACAUDAL; PERINEURAL AS NEEDED
Status: DISCONTINUED | OUTPATIENT
Start: 2024-04-08 | End: 2024-04-08

## 2024-04-08 RX ORDER — KETOROLAC TROMETHAMINE 30 MG/ML
INJECTION, SOLUTION INTRAMUSCULAR; INTRAVENOUS AS NEEDED
Status: DISCONTINUED | OUTPATIENT
Start: 2024-04-08 | End: 2024-04-08 | Stop reason: SURG

## 2024-04-08 RX ORDER — ONDANSETRON 2 MG/ML
4 INJECTION INTRAMUSCULAR; INTRAVENOUS EVERY 6 HOURS PRN
Status: CANCELLED | OUTPATIENT
Start: 2024-04-08

## 2024-04-08 RX ORDER — KETOROLAC TROMETHAMINE 30 MG/ML
15 INJECTION, SOLUTION INTRAMUSCULAR; INTRAVENOUS EVERY 6 HOURS
Status: CANCELLED | OUTPATIENT
Start: 2024-04-08 | End: 2024-04-09

## 2024-04-08 RX ORDER — SENNA AND DOCUSATE SODIUM 50; 8.6 MG/1; MG/1
1 TABLET, FILM COATED ORAL DAILY
Qty: 30 TABLET | Refills: 0 | Status: SHIPPED | OUTPATIENT
Start: 2024-04-08

## 2024-04-08 RX ORDER — ACETAMINOPHEN 500 MG
1000 TABLET ORAL EVERY 8 HOURS SCHEDULED
Status: CANCELLED | OUTPATIENT
Start: 2024-04-08

## 2024-04-08 RX ORDER — HYDROMORPHONE HYDROCHLORIDE 1 MG/ML
0.2 INJECTION, SOLUTION INTRAMUSCULAR; INTRAVENOUS; SUBCUTANEOUS EVERY 5 MIN PRN
Status: DISCONTINUED | OUTPATIENT
Start: 2024-04-08 | End: 2024-04-08

## 2024-04-08 RX ORDER — HYDROCODONE BITARTRATE AND ACETAMINOPHEN 5; 325 MG/1; MG/1
1 TABLET ORAL ONCE AS NEEDED
Status: COMPLETED | OUTPATIENT
Start: 2024-04-08 | End: 2024-04-08

## 2024-04-08 RX ORDER — HYDROMORPHONE HYDROCHLORIDE 1 MG/ML
0.4 INJECTION, SOLUTION INTRAMUSCULAR; INTRAVENOUS; SUBCUTANEOUS EVERY 5 MIN PRN
Status: DISCONTINUED | OUTPATIENT
Start: 2024-04-08 | End: 2024-04-08

## 2024-04-08 RX ORDER — OXYCODONE HYDROCHLORIDE 5 MG/1
5 TABLET ORAL EVERY 4 HOURS PRN
Status: CANCELLED | OUTPATIENT
Start: 2024-04-08

## 2024-04-08 RX ORDER — LIDOCAINE HYDROCHLORIDE 10 MG/ML
INJECTION, SOLUTION EPIDURAL; INFILTRATION; INTRACAUDAL; PERINEURAL AS NEEDED
Status: DISCONTINUED | OUTPATIENT
Start: 2024-04-08 | End: 2024-04-08 | Stop reason: SURG

## 2024-04-08 RX ORDER — CEFAZOLIN SODIUM 1 G/3ML
INJECTION, POWDER, FOR SOLUTION INTRAMUSCULAR; INTRAVENOUS AS NEEDED
Status: DISCONTINUED | OUTPATIENT
Start: 2024-04-08 | End: 2024-04-08 | Stop reason: SURG

## 2024-04-08 RX ORDER — SODIUM CHLORIDE, SODIUM LACTATE, POTASSIUM CHLORIDE, CALCIUM CHLORIDE 600; 310; 30; 20 MG/100ML; MG/100ML; MG/100ML; MG/100ML
INJECTION, SOLUTION INTRAVENOUS CONTINUOUS
Status: DISCONTINUED | OUTPATIENT
Start: 2024-04-08 | End: 2024-04-08

## 2024-04-08 RX ADMIN — LIDOCAINE HYDROCHLORIDE 100 MG: 10 INJECTION, SOLUTION EPIDURAL; INFILTRATION; INTRACAUDAL; PERINEURAL at 00:28:00

## 2024-04-08 RX ADMIN — METRONIDAZOLE 500 MG: 500 INJECTION, SOLUTION INTRAVENOUS at 00:44:00

## 2024-04-08 RX ADMIN — KETOROLAC TROMETHAMINE 30 MG: 30 INJECTION, SOLUTION INTRAMUSCULAR; INTRAVENOUS at 01:31:00

## 2024-04-08 RX ADMIN — ROCURONIUM BROMIDE 15 MG: 10 INJECTION, SOLUTION INTRAVENOUS at 01:17:00

## 2024-04-08 RX ADMIN — DEXAMETHASONE SODIUM PHOSPHATE 8 MG: 4 MG/ML VIAL (ML) INJECTION at 00:43:00

## 2024-04-08 RX ADMIN — ONDANSETRON 4 MG: 2 INJECTION INTRAMUSCULAR; INTRAVENOUS at 01:31:00

## 2024-04-08 RX ADMIN — CEFAZOLIN SODIUM 3 G: 1 INJECTION, POWDER, FOR SOLUTION INTRAMUSCULAR; INTRAVENOUS at 00:36:00

## 2024-04-08 RX ADMIN — ROCURONIUM BROMIDE 50 MG: 10 INJECTION, SOLUTION INTRAVENOUS at 00:28:00

## 2024-04-08 NOTE — OPERATIVE REPORT
PREOPERATIVE DIAGNOSIS: Acute appendicitis.   POSTOPERATIVE DIAGNOSIS: Acute appendicitis.   PROCEDURE PERFORMED: Laparoscopic appendectomy.   ANESTHESIA: General endotracheal anesthesia  Anesthesiologist.: Stefan Willis MD  SURGICAL ASSISTANT: OR staff.   FINDINGS: Acutely inflamed nonperforated appendix, removed and sent as specimen.   SPECIMENS: Appendix.   BLOOD LOSS: 2ml  COMPLICATIONS: None apparent.   DISPOSITION: The patient awakened from anesthesia, brought the recovery room in stable condition. She tolerated the procedure without apparent complication. Needle, sponge, and instrument counts were correct at the end of the procedure. Please note, preprocedure antibiotic and DVT prophylaxis were administered per protocol, and a time-out was performed prior to initiation of procedure, identifying the correct patient, procedure, surgeon.   INDICATION FOR PROCEDURE: Please review the preoperative history and physical. Briefly, the patient is a 68 year old male who presents with 1 day of generalized abdominal pain is now localized to the right lower quadrant. The patient was seen and evaluated in the Emergency Department. Diagnostic imaging is consistent with acute appendicitis. The patient is admitted and surgical consult is requested. The patient is interviewed in the presence of family. The risks, benefits, and alternatives of surgical intervention were explained to the patient and the family in detail, including but not limited to bleeding, infection, nondiagnostic yield, incorrect diagnosis, injury to adjacent organs and structures, postoperative infection and/or abscess creation, conversion to open procedure, and the need for further therapeutic, diagnostic, or surgical intervention. The patient and family voiced understanding. All of their pertinent questions were answered to their satisfaction, after whichwilling and informed consent to proceed with surgery was obtained.   DESCRIPTION OF PROCEDURE: The  patient was brought to the operating room, and after the induction of general endotracheal anesthesia, a time-out was performed. Next, the abdomen was prepped and draped in the usual sterile fashion. Next, the umbilicus was grasped and elevated with an Allis clamp and two perforating towel clips. A periumbilical incision was made, and a Veress needle was used to establish pneumoperitoneum. After pneumoperitoneum was established in the usual manner, a 5-mm trocar was placed through the incision, and a 5-mm laparoscope was inserted into the abdomen. Brief diagnostic survey reveals no abnormalities of the visualized abdominal viscera.     Attention was turned to the right lower quadrant, where an acutely inflamed appendix that was non-perforated was identified. Next, the patient was placed in Trendelenburg position, right side up. An 11-mm incision was made in the suprapubic position, through which a trocar was placed under direct vision, and a 5-mm incision was made in the left lower quadrant, through which a 5-mm trocar was placed under direct vision. Next, the appendix was grasped, elevated and retracted. It was bluntly  from its adhesions to the pelvic sidewall, and using blunt dissection, a window was made in the avascular space between the mesoappendix and the base of the appendix at the base of the cecum with the [place] dissector. Next, a 5-mm LigaSure device was used to divide the mesoappendix. An EndoGIA stapler with a blue cartridge was used to divide the base of the appendix, which was then placed into a retrival sac and removed from the suprapubic position under direct vision. The surgical site was then visualized and inspected. There was no bleeding or drainage. Hemostasis was excellent. The right lower quadrant was copiously irrigated until the effluent fluid was clear. The pelvis was also irrigated. Further evaluation revealed no other acute abnormalities within the abdomen.    At this point,  the patient was placed in neutral rotation. The 11-mm incision in the suprapubic position was reapproximated using 0 Maxon suture with an Endo Close device and a  guide. Next, the 5-mm trocar was removed under direct vision. Pneumoperitoneum was aspirated and released. The 5-mm camera and trocar were removed from the umbilical position. All wounds were cleansed and irrigated and injected with local anesthetic. All skin incisions were reapproximated using running subcuticular 4-0 Monocryl suture. Skin glue was used to reapproximate the incisions. The patient was awakened from anesthesia and brought to the recovery room in stable condition, having tolerated the procedure without apparent complication. Needle, sponge, and instrument counts were correct at the end of the procedure.     Operative findings were discussed with the patient's family immediately upon conclusion of surgery.         Aleksandr Rodrigues MD FACS  Trauma Medical Director, [facility] General Surgery    The 21st Century Cures Act makes medical notes like these available to patients in the interest of transparency. Please be advised this is a medical document. Medical documents are intended to carry relevant information, facts as evident, and the clinical opinion of the practitioner. The medical note is intended as peer to peer communication and may appear blunt or direct. It is written in medical language and may contain abbreviations or verbiage that are unfamiliar.    This note was prepared using Dragon Medical voice recognition dictation software. As a result, errors may occur. When identified, these errors have been corrected. While every attempt is made to correct errors during dictation, discrepancies may still exist.

## 2024-04-08 NOTE — ANESTHESIA PROCEDURE NOTES
Airway  Date/Time: 4/8/2024 12:30 AM  Urgency: elective      General Information and Staff    Patient location during procedure: OR  Anesthesiologist: Stefan Willis MD  Performed: anesthesiologist   Performed by: Stefan Willis MD  Authorized by: Stefan Willis MD      Indications and Patient Condition  Indications for airway management: anesthesia  Sedation level: deep  Preoxygenated: yes  Patient position: sniffing  Mask difficulty assessment: 1 - vent by mask    Final Airway Details  Final airway type: endotracheal airway      Successful airway: ETT  Cuffed: yes   Successful intubation technique: direct laryngoscopy  Endotracheal tube insertion site: oral  Blade: Ittus  Blade size: #3  ETT size (mm): 7.5    Cormack-Lehane Classification: grade IIB - view of arytenoids or posterior of glottis only  Placement verified by: capnometry   Measured from: lips  Number of attempts at approach: 1

## 2024-04-08 NOTE — CONSULTS
[facility]  Report of Consultation    Miguel Angel Zaragoza Patient Status:  Emergency    1955 MRN [id number]   [facility] EMERGENCY DEPARTMENT Attending Hector Brown,    Hosp Day # 0 PCP Juan De La Torre MD     Reason for Consultation:  Acute appendicitis    History of Present Illness:  Miguel Angel Zaragoza is a a(n) 68 year old male.  Patient presents to the emergency room complaining of diffuse abdominal pain that settled in the right lower quadrant started 2 nights ago.  Pain makes patient uncomfortable and he denies any other prodromal symptoms.  No other complaints offered.    Evaluation emergency room included CT scan of the abdomen pelvis revealing findings of acute enthesitis.  I reviewed the images and I concur with the interpretation    The patient denies fever, chills, chest pain, shortness of breath, dyspnea. The patient also denies hematemesis, melena, or hematochezia. The patient denies change in bowel or bladder habits. There is no complaint of hematuria or dysuria.    I discussed the risk, benefits, alternatives of surgery.  I discussed the anticipated postoperative recovery including dietary, activity, exercise, and lifestyle recommendations.  The patient's questions regarding surgical procedure were answered and the patient voiced understanding of the care plan.    History:  Past Medical History:   Diagnosis Date    Arthritis 2018    Back pain 2018    Disorder of prostate 2020    Disorder of thyroid     Hearing impairment     Bilateral hearing aids    Hearing loss 1974    wear hearing aids    Hemorrhoids 2020    Hx of motion sickness     Hyperlipidemia     Hypothyroid     Morbid obesity (HCC)     SKY (obstructive sleep apnea)     CPAP 12    SKY (obstructive sleep apnea) 2020 ESC Split Night    AHI 71 Supine  non-supine AHI 50 Sao2 Ricardo 85%    Osteoarthritis     Pain in joints 2020    Presence of other cardiac implants and grafts 2005    Prostate  cancer (HCC)     Sleep apnea     cpap    Wears glasses 1/2009     Past Surgical History:   Procedure Laterality Date    COLONOSCOPY N/A 3/1/2022    Procedure: COLONOSCOPY;  Surgeon: Fransico Johnson MD;  Location:  ENDOSCOPY    KNEE REPLACEMENT SURGERY Left 12/2005    LAPAROSCOPY, SURGICAL PROSTATECTOMY, RETROPUBIC RADICAL, W/NERVE SPARING  12/2020    Proton Therapy    TONSILLECTOMY      T&A     Family History   Problem Relation Age of Onset    Cancer Father         Renal    Cancer Sister     Neurological Disorder Brother         MS      reports that he has never smoked. He has never used smokeless tobacco. He reports current alcohol use of about 6.0 standard drinks of alcohol per week. He reports that he does not use drugs.    Allergies:  No Known Allergies    Medications:    Current Facility-Administered Medications:     [COMPLETED] sodium chloride 0.9 % IV bolus 500 mL, 500 mL, Intravenous, Once **FOLLOWED BY** sodium chloride 0.9% infusion, , Intravenous, Continuous    Review of Systems:  Pertinent items are noted in HPI.  A comprehensive 10 point review of systems was completed.  Pertinent positives and negatives noted in the the HPI.    Physical Exam:  Blood pressure 140/86, pulse 70, temperature 98 °F (36.7 °C), temperature source Oral, resp. rate 15, height 72\", weight (!) 320 lb (145.2 kg), SpO2 95%.    General: Alert, orientated x3.  Cooperative.  No apparent distress.  HEENT: Exam is unremarkable.  Without scleral icterus.  Mucous membranes are moist.   Oropharynx is clear.  Neck: No tenderness to palpitation.    No JVD. Supple.   Lungs: Clear to auscultation bilaterally.  Cardiac: Regular rate and rhythm.   Abdomen: Obese, soft, non-distended, tender in the right lower quadrant over McBurney's point, with no rebound or guarding.  No peritoneal signs. No ascites.  Liver is within normal limits.  Spleen is not palpable.    Extremities:  No lower extremity edema noted.  Without clubbing or cyanosis.     Neurologic: Cranial nerves are grossly intact.  Motor strength and sensory examination is grossly normal.  No focal neurologic deficit.    Laboratory Data:  Lab Results   Component Value Date    WBC 14.6 04/07/2024    HGB 15.5 04/07/2024    HCT 44.6 04/07/2024    .0 04/07/2024    CREATSERUM 0.83 04/07/2024    BUN 12 04/07/2024     04/07/2024    K 3.8 04/07/2024     04/07/2024    CO2 26.0 04/07/2024     04/07/2024    CA 9.2 04/07/2024    ALB 4.3 04/07/2024    ALKPHO 67 04/07/2024    BILT 2.2 04/07/2024    TP 7.5 04/07/2024    AST 19 04/07/2024    ALT 35 04/07/2024    LIP 21 04/07/2024         Impression and Plan:  Patient Active Problem List   Diagnosis    Chronic right-sided thoracic back pain    Elevated PSA    Paresthesias    Gallstones    Acute appendicitis, unspecified acute appendicitis type       Acute appendicitis.    The patient will be scheduled for scopic appendectomy tonight.  Awaiting or availability.    The wei-operative care plan was discussed with the patient, who voices understanding.  Activity and lifting recommendations were discussed in length.     The risks, benefits, and alternatives to the procedure were explained to the patient.  The risks explained include, but are not limited to, bleeding, infection, pain wound complications, recurrence, incorrect diagnosis, injury to adjacent organs and structures. We also discussed the possibile need for further therapeutic, diagnostic, or surgical intervention.  The patient voiced understanding, and after all questions were answered to the patient's satisfaction, the patient provided willing and informed consent to proceed.      Aleksandr Rodrigues MD FACS  Trauma Medical Director, [facility] General Surgery    The 21st Century Cures Act makes medical notes like these available to patients in the interest of transparency. Please be advised this is a medical document. Medical documents are intended to carry relevant  information, facts as evident, and the clinical opinion of the practitioner. The medical note is intended as peer to peer communication and may appear blunt or direct. It is written in medical language and may contain abbreviations or verbiage that are unfamiliar.    This note was prepared using Dragon Medical voice recognition dictation software. As a result, errors may occur. When identified, these errors have been corrected. While every attempt is made to correct errors during dictation, discrepancies may still exist.    4/7/2024  11:36 PM

## 2024-04-08 NOTE — BRIEF OP NOTE
Pre-Operative Diagnosis: Appendicitis [K37]     Post-Operative Diagnosis: Same      Procedure Performed:   LAPAROSCOPIC APPENDECTOMY    Surgeon(s) and Role:     * Aleksandr Rodrigues MD - Primary    Assistant(s):        Surgical Findings: Inflamed appendix     Specimen: appendix     Estimated Blood Loss: 2 ml    Dictation Number:      Aleksandr Rodrigues MD  4/8/2024  1:40 AM

## 2024-04-08 NOTE — H&P
[facility]     Miguel Angel Zaragoza Patient Status:  Emergency    1955 MRN [id number]   Location [facility] EMERGENCY DEPARTMENT Attending Hector Brown,    Hosp Day # 0 PCP Juan De La Torre MD     Chief Complaint: Abdominal pain     Subjective:    History of Present Illness:     Miguel Angel Zaragoza is a 68 year old male with  h/o Hemorrhoids, HTN, DM type 2, Prostate ca, Obesity. Patient is presenting with lower back pain that started Friday. PT notes today he had RLQ pain , nausea emesis x1 at home no documented fevers.     History/Other:    Past Medical History:  Past Medical History:   Diagnosis Date    Arthritis 2018    Back pain 2018    Disorder of prostate 2020    Disorder of thyroid     Hearing impairment     Bilateral hearing aids    Hearing loss 1974    wear hearing aids    Hemorrhoids 2020    Hx of motion sickness     Hyperlipidemia     Hypothyroid     Morbid obesity (HCC)     SKY (obstructive sleep apnea)     CPAP 12    SKY (obstructive sleep apnea) 2020 [facility] Split Night    AHI 71 Supine  non-supine AHI 50 Sao2 Ricardo 85%    Osteoarthritis     Pain in joints 2020    Presence of other cardiac implants and grafts 2005    Prostate cancer (HCC)     Sleep apnea     cpap    Wears glasses 2009     Past Surgical History:   Past Surgical History:   Procedure Laterality Date    COLONOSCOPY N/A 3/1/2022    Procedure: COLONOSCOPY;  Surgeon: Fransico Johnson MD;  Location:  ENDOSCOPY    KNEE REPLACEMENT SURGERY Left 2005    LAPAROSCOPY, SURGICAL PROSTATECTOMY, RETROPUBIC RADICAL, W/NERVE SPARING  2020    Proton Therapy    TONSILLECTOMY      T&A      Family History:   Family History   Problem Relation Age of Onset    Cancer Father         Renal    Cancer Sister     Neurological Disorder Brother         MS     Social History:    reports that he has never smoked. He has never used smokeless tobacco. He reports current alcohol use of about  6.0 standard drinks of alcohol per week. He reports that he does not use drugs.     Allergies: No Known Allergies    Medications:    No current facility-administered medications on file prior to encounter.     Current Outpatient Medications on File Prior to Encounter   Medication Sig Dispense Refill    terbinafine 250 MG Oral Tab       Ciclopirox 8 % External Solution       amoxicillin clavulanate 875-125 MG Oral Tab Take 1 tablet by mouth 2 (two) times daily with meals.      Vardenafil HCl 20 MG Oral Tab Take 1 tablet (20 mg total) by mouth As Directed.      Sildenafil Citrate 100 MG Oral Tab Take 1 tablet by mouth up to once per day as directed      tamsulosin 0.4 MG Oral Cap Take 1 capsule (0.4 mg total) by mouth in the morning and 1 capsule (0.4 mg total) before bedtime.      celecoxib 200 MG Oral Cap Take 1 capsule (200 mg total) by mouth 2 (two) times daily. Pt usually takes once daily      Rosuvastatin Calcium 10 MG Oral Tab   2    MetFORMIN HCl 500 MG Oral Tab Take 1 tablet (500 mg total) by mouth 2 (two) times daily with meals.      furosemide 20 MG Oral Tab Take 1 tablet (20 mg total) by mouth daily.  0    Levothyroxine Sodium 150 MCG Oral Tab Take 1 tablet (150 mcg total) by mouth daily.  0       Review of Systems:   A comprehensive review of systems was completed.    Pertinent positives and negatives noted in the HPI.    Objective:   Physical Exam:    /83   Pulse 85   Temp 98 °F (36.7 °C) (Oral)   Resp 15   Ht 6' (1.829 m)   Wt (!) 320 lb (145.2 kg)   SpO2 97%   BMI 43.40 kg/m²   General: No acute distress, Alert  Respiratory: No rhonchi, no wheezes  Cardiovascular: S1, S2. Regular rate and rhythm  Abdomen: Soft, Non-tender, non-distended, positive bowel sounds  Neuro: No new focal deficits  Extremities: No edema      Results:    Labs:      Labs Last 24 Hours:    Recent Labs   Lab 04/07/24  1741   RBC 5.06   HGB 15.5   HCT 44.6   MCV 88.1   MCH 30.6   MCHC 34.8   RDW 13.2   NEPRELIM 12.85*    WBC 14.6*   .0       Recent Labs   Lab 04/07/24  1741   *   BUN 12   CREATSERUM 0.83   EGFRCR 95   CA 9.2   ALB 4.3      K 3.8      CO2 26.0   ALKPHO 67   AST 19   ALT 35   BILT 2.2*   TP 7.5       No results found for: \"PT\", \"INR\"    No results for input(s): \"TROP\", \"TROPHS\", \"CK\" in the last 168 hours.    No results for input(s): \"TROP\", \"PBNP\" in the last 168 hours.    No results for input(s): \"PCT\" in the last 168 hours.    Imaging: Imaging data reviewed in Epic.    Assessment & Plan:      #Acute appendicitis  NPO  IVF  Anti emetics  Surgery on CS  #Hypertension  Continue home meds  PRN hydralazine  #DM type 2  Hold orals  ISS  #thyroid dx  levothyroxine      Plan of care discussed with pt, spouse     Danuta Vega MD    Supplementary Documentation:     The 21st Century Cures Act makes medical notes like these available to patients in the interest of transparency. Please be advised this is a medical document. Medical documents are intended to carry relevant information, facts as evident, and the clinical opinion of the practitioner. The medical note is intended as peer to peer communication and may appear blunt or direct. It is written in medical language and may contain abbreviations or verbiage that are unfamiliar.

## 2024-04-08 NOTE — TELEPHONE ENCOUNTER
Patient called to know if he can have CPT and PSA diagnostic but insurance would not be let it be [facility] for July appointment, patient would like to know if Dr Wang can request order for sooner?  That is requesting to be completed by aug 1st but isnt scheduled until July.

## 2024-04-08 NOTE — ANESTHESIA PREPROCEDURE EVALUATION
PRE-OP EVALUATION    Patient Name: Miguel Angel Zaragoza    Admit Diagnosis: Acute appendicitis, unspecified acute appendicitis type [K35.80]    Pre-op Diagnosis: Appendicitis [K37]    LAPAROSCOPIC APPENDECTOMY    Anesthesia Procedure: LAPAROSCOPIC APPENDECTOMY (Abdomen)    Surgeon(s) and Role:     * Aleksandr Rodrigues MD - Primary    Pre-op vitals reviewed.  Temp: 98 °F (36.7 °C)  Pulse: 70  Resp: 15  BP: 140/86  SpO2: 95 %  Body mass index is 43.4 kg/m².    Current medications reviewed.  Hospital Medications:   [COMPLETED] ondansetron (Zofran) 4 MG/2ML injection 4 mg  4 mg Intravenous Once    [COMPLETED] sodium chloride 0.9 % IV bolus 500 mL  500 mL Intravenous Once    Followed by    sodium chloride 0.9% infusion   Intravenous Continuous    [COMPLETED] iopamidol 76% (ISOVUE-370) injection for power injector  100 mL Intravenous ONCE PRN    [COMPLETED] piperacillin-tazobactam (Zosyn) 4.5 g in dextrose 5% 100 mL IVPB-ADDV  4.5 g Intravenous Once    [COMPLETED] HYDROmorphone (Dilaudid) 1 MG/ML injection 0.5 mg  0.5 mg Intravenous Once       Outpatient Medications:     Medications Prior to Admission   Medication Sig Dispense Refill Last Dose    terbinafine 250 MG Oral Tab        Ciclopirox 8 % External Solution        amoxicillin clavulanate 875-125 MG Oral Tab Take 1 tablet by mouth 2 (two) times daily with meals.       Vardenafil HCl 20 MG Oral Tab Take 1 tablet (20 mg total) by mouth As Directed.       Sildenafil Citrate 100 MG Oral Tab Take 1 tablet by mouth up to once per day as directed       tamsulosin 0.4 MG Oral Cap Take 1 capsule (0.4 mg total) by mouth in the morning and 1 capsule (0.4 mg total) before bedtime.       celecoxib 200 MG Oral Cap Take 1 capsule (200 mg total) by mouth 2 (two) times daily. Pt usually takes once daily       Rosuvastatin Calcium 10 MG Oral Tab   2     MetFORMIN HCl 500 MG Oral Tab Take 1 tablet (500 mg total) by mouth 2 (two) times daily with meals.       furosemide 20 MG Oral Tab Take  1 tablet (20 mg total) by mouth daily.  0     Levothyroxine Sodium 150 MCG Oral Tab Take 1 tablet (150 mcg total) by mouth daily.  0        Allergies: Patient has no known allergies.      Anesthesia Evaluation    Patient summary reviewed.    Anesthetic Complications           GI/Hepatic/Renal    Negative GI/hepatic/renal ROS.                             Cardiovascular    Negative cardiovascular ROS.  ECG reviewed.          (+) obesity                                       Endo/Other    Negative endo/other ROS.                              Pulmonary                    (+) sleep apnea       Neuro/Psych    Negative neuro/psych ROS.                          Patient Active Problem List:     Chronic right-sided thoracic back pain     Elevated PSA     Paresthesias     Gallstones     Acute appendicitis, unspecified acute appendicitis type           Past Surgical History:   Procedure Laterality Date    COLONOSCOPY N/A 3/1/2022    Procedure: COLONOSCOPY;  Surgeon: Fransico Johnson MD;  Location:  ENDOSCOPY    KNEE REPLACEMENT SURGERY Left 12/2005    LAPAROSCOPY, SURGICAL PROSTATECTOMY, RETROPUBIC RADICAL, W/NERVE SPARING  12/2020    Proton Therapy    TONSILLECTOMY      T&A     Social History     Socioeconomic History    Marital status:    Tobacco Use    Smoking status: Never    Smokeless tobacco: Never   Vaping Use    Vaping Use: Never used   Substance and Sexual Activity    Alcohol use: Yes     Alcohol/week: 6.0 standard drinks of alcohol     Types: 1 Glasses of wine, 2 Cans of beer, 3 Standard drinks or equivalent per week     Comment: social    Drug use: No   Other Topics Concern    Caffeine Concern Yes     Comment: 2 cup daily coffee    Exercise Yes     Comment: yoga     History   Drug Use No     Available pre-op labs reviewed.  Lab Results   Component Value Date    WBC 14.6 (H) 04/07/2024    RBC 5.06 04/07/2024    HGB 15.5 04/07/2024    HCT 44.6 04/07/2024    MCV 88.1 04/07/2024    MCH 30.6 04/07/2024    [facility]  34.8 04/07/2024    RDW 13.2 04/07/2024    .0 04/07/2024     Lab Results   Component Value Date     04/07/2024    K 3.8 04/07/2024     04/07/2024    CO2 26.0 04/07/2024    BUN 12 04/07/2024    CREATSERUM 0.83 04/07/2024     (H) 04/07/2024    CA 9.2 04/07/2024            Airway      Mallampati: II  Mouth opening: >3 FB  TM distance: > 6 cm  Neck ROM: full Cardiovascular    Cardiovascular exam normal.         Dental    Dentition appears grossly intact         Pulmonary    Pulmonary exam normal.                 Other findings              ASA: 2   Plan: general  NPO status verified and patient meets guidelines.        Comment:  I explained intrinsic risks of general anesthesia, including nausea, dental damage, sore throat, mouth injury,and hoarseness from airway management.  All questions were answered and understanding was demonstrated of risks.  Informed permission was obtained to proceed as documented in the signed consent form.        Plan/risks discussed with: patient                Present on Admission:  **None**

## 2024-04-08 NOTE — DISCHARGE INSTRUCTIONS
Home Care Instructions  Laparoscopic Appendectomy  Dr Aleksandr Rodrigues    MEDICATIONS  For post-operative pain control the medications are usually Vicodin or Norco. These are narcotics and are best taken by starting with one tablet and repeating every four to six hours as needed. If the patient does not feel they need the narcotics they shouldn’t take them. If the pain is severe the patient may take up to two pills every four hours. If a minor supplement is necessary in addition to the narcotics do not overlap with Tylenol (any product with acetaminophen) as both Vicodin and Norco contain Tylenol. Please supplement with Advil (ibuprofen) or Motrin. Please ask your surgeon before resuming blood thinners such as aspirin, plavix or coumadin. All other home medications may be resumed as scheduled. With severe appendicitis antibiotics will also be prescribed. With antibiotics, please watch for rash, facial swelling or severe diarrhea.    DIET  The patient may resume a general diet immediately. This is not a good time to eat excessively. The patient should eat in moderation and stick with foods the patient feels are easy to digest. There should be no alcohol consumption in the immediate recover time period or within six hours of taking narcotics.    WOUND CARE  The top dressing may be removed the day after surgery. This includes the gauze, tape and band-aids if they are present. Do not remove the steri-strips or butterfly tapes that are white and adherent to the skin. The steri-strips will eventually peel up at the ends and at this point they may be removed. This is usually seven to ten days after surgery. The patient may shower the day after surgery. There is no need to cover the incisions and all top gauze type dressing should be removed prior to showering. Soap can get on the wounds but do not scrub over the wounds. No hair dye or chemicals of any kind should get in the wounds. Avoid tub baths for two weeks. If visible  sutures or staples are present they will be removed in the office by the surgeon or nurse. Most wounds will be closed with dissolving suture underneath the skin. These sutures will dissolve on their own.    ACTIVITY  Every day the patient should be up, showered and dressed. Each day the patient should be up and around the house. The patient should not lie in bed and should not stay in pajamas. We count on the patient being up, coughing, walking and deep breathing to avoid pneumonia and blood clots in the legs. Once a day the patient should get out of the house and go shopping, go to the mall, the [employer] store, the [employer] or a restaurant. The patient may ride in a car but should not drive the car for at least one week. Patients should be off narcotics for at least 8 hours prior to driving. The average time off work is 10 to 14 days; most adults will be seeing the surgeon prior to returning to work. Students will return to school within 1-5 days after discharge from the hospital but will be off gym, sports, and indoors for recess for one month. Patients may go up and down stairs and lift up to five pounds but no bending, pushing or pulling. Nothing called work or exercise until the follow up visit. Patients should seek further activity limits at the time of their appointment.    APPOINTMENT  Please call our office for an appointment within five to ten days of discharge Any fever greater than 100.5, chills, nausea, vomiting, or severe diarrhea please call our office. If the wound turns red, hot, swollen, becomes increasingly painful, or drains pus call us immediately at [phone]. For life threatening emergencies call 911. For non-emergent care please call our office after 9:30 a.m. Monday through Saturday. The number listed above is our office number, our phone automatically switches to out answering service if we are not there. Please do not use the emergency room for non-urgent care.      Thank you for  entrusting me with your care.

## 2024-04-08 NOTE — ANESTHESIA POSTPROCEDURE EVALUATION
[facility]    Miguel Angel Zaragoza Patient Status:  Emergency   Age/Gender 68 year old male MRN [id number]   Location [facility] POST ANESTHESIA CARE UNIT Attending Aleksandr Rodrigues MD   Hosp Day # 0 PCP Juan De La Torre MD       Anesthesia Post-op Note    LAPAROSCOPIC APPENDECTOMY    Procedure Summary       Date: 04/08/24 Room / Location:  MAIN OR 09 / [facility] MAIN OR    Anesthesia Start: 0022 Anesthesia Stop: 0153    Procedure: LAPAROSCOPIC APPENDECTOMY (Abdomen) Diagnosis:       Appendicitis      (Appendicitis [K37])    Surgeons: Aleksandr Rodrigues MD Anesthesiologist: Stefan Willis MD    Anesthesia Type: general ASA Status: 2            Anesthesia Type: general    Vitals Value Taken Time   /78 04/08/24 0155   Temp 97 04/08/24 0155   Pulse 76 04/08/24 0155   Resp 20 04/08/24 0155   SpO2 94 04/08/24 0155       Patient Location: PACU    Anesthesia Type: general    Airway Patency: patent    Postop Pain Control: adequate    Mental Status: mildly sedated but able to meaningfully participate in the post-anesthesia evaluation    Nausea/Vomiting: none    Cardiopulmonary/Hydration status: stable euvolemic    Complications: no apparent anesthesia related complications    Postop vital signs: stable    Dental Exam: Unchanged from Preop    Patient to be discharged from PACU when criteria met.

## 2024-04-09 ENCOUNTER — PATIENT MESSAGE (OUTPATIENT)
Dept: SURGERY | Facility: CLINIC | Age: 69
End: 2024-04-09

## 2024-04-22 ENCOUNTER — OFFICE VISIT (OUTPATIENT)
Facility: LOCATION | Age: 69
End: 2024-04-22
Payer: MEDICARE

## 2024-04-22 VITALS — HEART RATE: 91 BPM | TEMPERATURE: 98 F

## 2024-04-22 DIAGNOSIS — Z90.49 STATUS POST LAPAROSCOPIC APPENDECTOMY: ICD-10-CM

## 2024-04-22 DIAGNOSIS — Z98.890 POST-OPERATIVE STATE: Primary | ICD-10-CM

## 2024-04-22 NOTE — PROGRESS NOTES
Post Operative Visit Note       Active Problems  1. Post-operative state    2. Status post laparoscopic appendectomy         Chief Complaint   Chief Complaint   Patient presents with    Post-Op     PO - LAPAROSCOPIC APPENDECTOMY, UMBILICAL HERNIA REPAIR 4/8/24, Pt. Denies all symptoms.           History of Present Illness   The patient presents for continued care and evaluation following a laparoscopic appendectomy with Dr. Rodrigues on 4/8/24.    Overall, the patient has been doing very well postoperatively.  He denies any abdominal pain.  He states his umbilical incision has not healed as well as the other 2 incisions, and it still has a small scab over it.  Majority of the skin glue has come off.    He states he is tolerating a general diet.  He denies nausea or vomiting.  He states he had some constipation the day of surgery, but this is since resolved.    He does not require a return to work note.            Allergies  Miguel Angel has No Known Allergies.    Past Medical / Surgical / Social / Family History    The past medical and past surgical history have been reviewed by me today.     Past Medical History:    Arthritis    Back pain    Disorder of prostate    Disorder of thyroid    Hearing impairment    Bilateral hearing aids    Hearing loss    wear hearing aids    Hemorrhoids    Hx of motion sickness    Hyperlipidemia    Hypothyroid    Morbid obesity (HCC)    SKY (obstructive sleep apnea)    CPAP 12    SKY (obstructive sleep apnea)    AHI 71 Supine  non-supine AHI 50 Sao2 Ricardo 85%    Osteoarthritis    Pain in joints    Presence of other cardiac implants and grafts    Prostate cancer (HCC)    Sleep apnea    cpap    Wears glasses     Past Surgical History:   Procedure Laterality Date    Colonoscopy N/A 3/1/2022    Procedure: COLONOSCOPY;  Surgeon: Fransico Johnson MD;  Location:  ENDOSCOPY    Knee replacement surgery Left 12/2005    Laparoscopy, surgical prostatectomy, retropubic radical, w/nerve sparing   12/2020    Proton Therapy    Tonsillectomy      T&A       The family history and social history have been reviewed by me today.    Family History   Problem Relation Age of Onset    Cancer Father         Renal    Cancer Sister     Neurological Disorder Brother         MS     Social History     Socioeconomic History    Marital status:    Tobacco Use    Smoking status: Never    Smokeless tobacco: Never   Vaping Use    Vaping status: Never Used   Substance and Sexual Activity    Alcohol use: Yes     Alcohol/week: 6.0 standard drinks of alcohol     Types: 1 Glasses of wine, 2 Cans of beer, 3 Standard drinks or equivalent per week     Comment: social    Drug use: No   Other Topics Concern    Caffeine Concern Yes     Comment: 2 cup daily coffee    Exercise Yes     Comment: yoga        Current Outpatient Medications:     senna-docusate 8.6-50 MG Oral Tab, Take 1 tablet by mouth daily., Disp: 30 tablet, Rfl: 0    oxyCODONE 5 MG Oral Tab, Take 1 tablet (5 mg total) by mouth every 6 (six) hours as needed for Pain., Disp: 5 tablet, Rfl: 0    terbinafine 250 MG Oral Tab, , Disp: , Rfl:     Ciclopirox 8 % External Solution, , Disp: , Rfl:     amoxicillin clavulanate 875-125 MG Oral Tab, Take 1 tablet by mouth 2 (two) times daily with meals., Disp: , Rfl:     Vardenafil HCl 20 MG Oral Tab, Take 1 tablet (20 mg total) by mouth As Directed., Disp: , Rfl:     Sildenafil Citrate 100 MG Oral Tab, Take 1 tablet by mouth up to once per day as directed, Disp: , Rfl:     tamsulosin 0.4 MG Oral Cap, Take 1 capsule (0.4 mg total) by mouth in the morning and 1 capsule (0.4 mg total) before bedtime., Disp: , Rfl:     celecoxib 200 MG Oral Cap, Take 1 capsule (200 mg total) by mouth 2 (two) times daily. Pt usually takes once daily, Disp: , Rfl:     Rosuvastatin Calcium 10 MG Oral Tab, , Disp: , Rfl: 2    MetFORMIN HCl 500 MG Oral Tab, Take 1 tablet (500 mg total) by mouth 2 (two) times daily with meals., Disp: , Rfl:     furosemide 20  MG Oral Tab, Take 1 tablet (20 mg total) by mouth daily., Disp: , Rfl: 0    Levothyroxine Sodium 150 MCG Oral Tab, Take 1 tablet (150 mcg total) by mouth daily., Disp: , Rfl: 0      Review of Systems  The Review of Systems has been reviewed by me during today.  Review of Systems    Physical Findings   Pulse 91   Temp 98.4 °F (36.9 °C) (Temporal)   Physical Exam  Vitals and nursing note reviewed.   Constitutional:       General: He is not in acute distress.     Appearance: Normal appearance. He is obese.   HENT:      Head: Normocephalic and atraumatic.      Right Ear: External ear normal.      Left Ear: External ear normal.      Nose: Nose normal.   Eyes:      General: No scleral icterus.     Conjunctiva/sclera: Conjunctivae normal.   Abdominal:      General: Abdomen is flat. There is no distension.      Palpations: Abdomen is soft. There is no mass.      Tenderness: There is no abdominal tenderness.      Hernia: No hernia is present.      Comments: Clinical exam of the of the abdomen reveals it to be obese, soft, nondistended without tympany to percussion.  Abdomen is nontender to palpation.  Laparoscopic incision sites are clean, dry, intact without surrounding erythema or cellulitis.  The umbilical incision has a small scab in the middle.  But the remaining 2 incisions are almost entirely healed.   Musculoskeletal:      Cervical back: Normal range of motion and neck supple.   Neurological:      Mental Status: He is alert.   Psychiatric:         Mood and Affect: Mood normal.         Behavior: Behavior normal.         Thought Content: Thought content normal.             Assessment   1. Post-operative state    2. Status post laparoscopic appendectomy          Plan     The patient is doing well status post laparoscopic appendectomy.    I took the opportunity at this appointment to discuss the pathology with the patient which revealed acute appendicitis with serositis.      I discussed with the patient that they  should refrain from any bending, pushing, pulling, twisting, or lifting of a force greater than 15-20 pounds for 4 weeks post-op. Activity restrictions were reviewed.     The patient may shower. They should avoid scrubbing their incisions, but may allow soap and water to wash over the incisions. The patient should avoid submerging their incisions in a bath, hot tub, pool for a total of 2 weeks postoperatively.    The patient should continue a general diet.    The patient may take ibuprofen and Tylenol as needed for pain management.  They may also utilize heating pads or ice packs for comfort measures.    All of the patient's questions were answered.  The patient verbalized understanding and agreement with the plan of care.    I have no further follow-up scheduled with this patient at this time.  This patient can see a Physician Assistant or Dr. Rodrigues on an as-needed basis.  This patient should return urgently for any problems or complications related to the surgical intervention.         No orders of the defined types were placed in this encounter.      Imaging & Referrals   None    Follow Up  No follow-ups on file.    Gail Alejandro PA-C

## 2024-06-17 ENCOUNTER — HOSPITAL ENCOUNTER (OUTPATIENT)
Dept: ULTRASOUND IMAGING | Age: 69
Discharge: HOME | End: 2024-06-17
Attending: SURGERY

## 2024-06-17 DIAGNOSIS — N28.1 RENAL CYST: ICD-10-CM

## 2024-06-17 PROCEDURE — 76770 US EXAM ABDO BACK WALL COMP: CPT | Performed by: SURGERY

## 2024-06-18 NOTE — PROGRESS NOTES
CT scan 4/7/2024 shows no growth in small hyperdense left upper pole renal cyst.  Renal ultrasound did not visualize the cyst.    Appointment 7/18/2024.

## 2024-07-17 ENCOUNTER — TELEPHONE (OUTPATIENT)
Dept: SURGERY | Facility: CLINIC | Age: 69
End: 2024-07-17

## 2024-07-17 NOTE — TELEPHONE ENCOUNTER
Per patient his test results came back good and asking if a follow up is needed if it can be a video visit. Please advise

## 2024-07-18 NOTE — TELEPHONE ENCOUNTER
This encounter is now closed.     RN called patient. Declined 7/19 appt. Patient recovering from COVID. Patient prefers next week appointment. Offered video visit on 7/23 Tuesday at 11:15 AM. He agreed to plans. He will complete E-check in prior.

## 2024-07-23 ENCOUNTER — VIRTUAL PHONE E/M (OUTPATIENT)
Dept: SURGERY | Facility: CLINIC | Age: 69
End: 2024-07-23
Payer: MEDICARE

## 2024-07-23 DIAGNOSIS — C61 PROSTATE CANCER (HCC): Primary | ICD-10-CM

## 2024-07-23 DIAGNOSIS — R73.03 PRE-DIABETES: ICD-10-CM

## 2024-07-23 DIAGNOSIS — N28.1 RENAL CYST: ICD-10-CM

## 2024-07-23 DIAGNOSIS — N20.0 NEPHROLITHIASIS: ICD-10-CM

## 2024-07-23 PROCEDURE — 99441 PHONE E/M BY PHYS 5-10 MIN: CPT | Performed by: SURGERY

## 2024-07-23 NOTE — PROGRESS NOTES
Urology Clinic Note  Telemedicine Visit  Audio Only  The patient consented to performing this visit virtually.     Primary Care Provider:  Juan De La Torre MD     Chief Complaint:   Prostate cancer, ED, renal cyst follow-up     HPI:   Miguel Angel Zaragoza is a 68 year old male with history of arthritis, thyroid disorder, hemorrhoids, hyperlipidemia, obesity, SKY, prostate cancer ([place] 3+4 diagnosed in 2020 by Dr. Garcia) s/p proton therapy and ADT for 6 months. PSA was 0.06 on 4/18/2023. One year prior it was 0.07. PSA on 7/19/2023 was up to 0.12.       Testosterone 277 (4/18/2023). He does note mild decreased energy and libido.  He was interested in having his testosterone checked, I ordered these labs last time but he does not look like he is [facility] lab performed the testosterone.  He is not particularly interested in testosterone therapy given history of prostate cancer.     He has ED as well that is well controlled at this time with Levitra.  He gets headaches with Cialis and Viagra.     Renal ultrasound on 2/1/2023 showed a 1.9 cm simple left upper pole renal cyst that is slightly larger than on prior imaging from 2020 (1.6 cm at that time).  He does have a family history of RCC.  I ordered an abdominal MRI on 7/25/2023 that showed this to be consistent with a hemorrhagic or proteinaceous cyst, measuring 1.9 cm.  I discussed that this is most likely a benign finding, but given his family history of renal cancer in brother and father would like to continue monitoring this with an ultrasound in 1 year.  I also placed a genetics referral given personal history of prostate cancer and family history of multiple cancers.     PSA in 9/7/2023 was back down to 0.09.    Outside hospital labs from 1/3/2024 reviewed:     Creatinine 0.74  A1c 5.7  Testosterone 284  PSA 0.14    CT scan 4/7/2024 shows a stable 1.8 cm hyperdense left upper pole renal cyst. Renal ultrasound did not visualize the cyst.     He is doing well at  this time with no urologic complaints.    PSA:  Lab Results   Component Value Date    PSAS 0.07 04/25/2022        History:     Past Medical History:    Arthritis    Back pain    Disorder of prostate    Disorder of thyroid    Hearing impairment    Bilateral hearing aids    Hearing loss    wear hearing aids    Hemorrhoids    Hx of motion sickness    Hyperlipidemia    Hypothyroid    Morbid obesity (HCC)    SKY (obstructive sleep apnea)    CPAP 12    SKY (obstructive sleep apnea)    AHI 71 Supine  non-supine AHI 50 Sao2 Ricardo 85%    Osteoarthritis    Pain in joints    Presence of other cardiac implants and grafts    Prostate cancer (HCC)    Sleep apnea    cpap    Wears glasses       Past Surgical History:   Procedure Laterality Date    Colonoscopy N/A 3/1/2022    Procedure: COLONOSCOPY;  Surgeon: Fransico Johnson MD;  Location:  ENDOSCOPY    Knee replacement surgery Left 12/2005    Laparoscopy, surgical prostatectomy, retropubic radical, w/nerve sparing  12/2020    Proton Therapy    Tonsillectomy      T&A       Family History   Problem Relation Age of Onset    Cancer Father         Renal    Cancer Sister     Neurological Disorder Brother         MS       Social History     Socioeconomic History    Marital status:    Tobacco Use    Smoking status: Never    Smokeless tobacco: Never   Vaping Use    Vaping status: Never Used   Substance and Sexual Activity    Alcohol use: Yes     Alcohol/week: 6.0 standard drinks of alcohol     Types: 1 Glasses of wine, 2 Cans of beer, 3 Standard drinks or equivalent per week     Comment: social    Drug use: No   Other Topics Concern    Caffeine Concern Yes     Comment: 2 cup daily coffee    Exercise Yes     Comment: yoga     Social Determinants of Health      [facility]    Social Connections    [facility]    Housing Stability        Medications (Active prior to today's visit):  Current Outpatient Medications   Medication Sig Dispense Refill    senna-docusate 8.6-50 MG Oral Tab Take 1 tablet by mouth daily. 30 tablet 0    oxyCODONE 5 MG Oral Tab Take 1 tablet (5 mg total) by mouth every 6 (six) hours as needed for Pain. 5 tablet 0    terbinafine 250 MG Oral Tab       Ciclopirox 8 % External Solution       amoxicillin clavulanate 875-125 MG Oral Tab Take 1 tablet by mouth 2 (two) times daily with meals.      Vardenafil HCl 20 MG Oral Tab Take 1 tablet (20 mg total) by mouth As Directed.      Sildenafil Citrate 100 MG Oral Tab Take 1 tablet by mouth up to once per day as directed      tamsulosin 0.4 MG Oral Cap Take 1 capsule (0.4 mg total) by mouth in the morning and 1 capsule (0.4 mg total) before bedtime.      celecoxib 200 MG Oral Cap Take 1 capsule (200 mg total) by mouth 2 (two) times daily. Pt usually takes once daily      Rosuvastatin Calcium 10 MG Oral Tab   2    MetFORMIN HCl 500 MG Oral Tab Take 1 tablet (500 mg total) by mouth 2 (two) times daily with meals.      furosemide 20 MG Oral Tab Take 1 tablet (20 mg total) by mouth daily.  0    Levothyroxine Sodium 150 MCG Oral Tab Take 1 tablet (150 mcg total) by mouth daily.  0       Allergies:  No Known Allergies    Review of Systems:   A comprehensive 10-point review of systems was completed.  Pertinent positives and negatives are noted in the the HPI.    Physical Exam:   No physical exam performed during this telephone encounter.    Assessment & Plan:   Miguel Angel Zaragoza is a 68 year old male with history of arthritis, thyroid disorder, hemorrhoids, hyperlipidemia, obesity, SKY, prostate cancer ([place] 3+4 diagnosed in 2020 by Dr. Garcia) s/p proton therapy and ADT for 6 months. PSA was 0.06 on 4/18/2023. One year prior it was 0.07. PSA on 7/19/2023 was up to 0.12.       Testosterone 277 (4/18/2023). He does note mild decreased energy and libido.  He was interested in having his  testosterone checked, I ordered these labs last time but he does not look like he is [facility] performed the testosterone.  He is not particularly interested in testosterone therapy given history of prostate cancer.     He has ED as well that is well controlled at this time with Levitra.  He gets headaches with Cialis and Viagra.     Renal ultrasound on 2/1/2023 showed a 1.9 cm simple left upper pole renal cyst that is slightly larger than on prior imaging from 2020 (1.6 cm at that time).  He does have a family history of RCC.  I ordered an abdominal MRI on 7/25/2023 that showed this to be consistent with a hemorrhagic or proteinaceous cyst, measuring 1.9 cm.  I discussed that this is most likely a benign finding, but given his family history of renal cancer in brother and father would like to continue monitoring this with an ultrasound in 1 year.  I also placed a genetics referral given personal history of prostate cancer and family history of multiple cancers.     PSA in 9/7/2023 was back down to 0.09.    Outside hospital labs from 1/3/2024 reviewed:     Creatinine 0.74  A1c 5.7  Testosterone 284  PSA 0.14    CT scan 4/7/2024 shows a stable 1.8 cm hyperdense left upper pole renal cyst. Renal ultrasound did not visualize the cyst.     He is doing well at this time with no urologic complaints.    -Repeat PSA level  -Renal ultrasound in 1 year    In total, 5 minutes were spent on this patient encounter for medical discussion.    Pete Wang MD  Staff Urologist  [facility]  Office: [phone]

## 2024-09-11 LAB — PSA, TOTAL: 0.18 NG/ML

## 2024-11-20 ENCOUNTER — TELEPHONE (OUTPATIENT)
Dept: SURGERY | Facility: CLINIC | Age: 69
End: 2024-11-20

## 2024-11-20 NOTE — TELEPHONE ENCOUNTER
Pt called.  At last appointment discussed injection.  Viagra does not work.  Pt requesting to schedule an appointment for how to give the injection.  Please call pt

## 2024-12-06 ENCOUNTER — OFFICE VISIT (OUTPATIENT)
Dept: SURGERY | Facility: CLINIC | Age: 69
End: 2024-12-06
Payer: MEDICARE

## 2024-12-06 ENCOUNTER — PATIENT MESSAGE (OUTPATIENT)
Dept: SURGERY | Facility: CLINIC | Age: 69
End: 2024-12-06

## 2024-12-06 DIAGNOSIS — C61 PROSTATE CANCER (HCC): ICD-10-CM

## 2024-12-06 DIAGNOSIS — N52.9 ERECTILE DYSFUNCTION, UNSPECIFIED ERECTILE DYSFUNCTION TYPE: Primary | ICD-10-CM

## 2024-12-06 RX ORDER — TADALAFIL 5 MG/1
5 TABLET ORAL DAILY
Qty: 30 TABLET | Refills: 5 | Status: SHIPPED | OUTPATIENT
Start: 2024-12-06 | End: 2024-12-09

## 2024-12-07 NOTE — PROGRESS NOTES
Subjective:   Miguel Angel Zaragoza is a 68 year old male with hx of arthritis, hypothyroidism, HL, obesity, SKY, PCA s/p proton therapy and ADT 2020, who presents for follow up ED.    Patient has taken PDE5 medications with success but significant headaches. Here today for trimix injection trial.    PSA 9/10/24 0.18  PSA 9/7/23 0.09  Lab Results   Component Value Date    PSAS 0.07 04/25/2022   PSA 4/18/23 0.06    History/Other:    Chief Complaint Reviewed and Verified  Nursing Notes Reviewed and   Verified  Allergies Reviewed  Medications Reviewed         Current Outpatient Medications   Medication Sig Dispense Refill    tadalafil 5 MG Oral Tab Take 1 tablet (5 mg total) by mouth daily. 30 tablet 5    amoxicillin clavulanate 875-125 MG Oral Tab Take 1 tablet by mouth 2 (two) times daily with meals.      tamsulosin 0.4 MG Oral Cap Take 1 capsule (0.4 mg total) by mouth in the morning and 1 capsule (0.4 mg total) before bedtime.      celecoxib 200 MG Oral Cap Take 1 capsule (200 mg total) by mouth 2 (two) times daily. Pt usually takes once daily      Rosuvastatin Calcium 10 MG Oral Tab   2    MetFORMIN HCl 500 MG Oral Tab Take 1 tablet (500 mg total) by mouth 2 (two) times daily with meals.      furosemide 20 MG Oral Tab Take 1 tablet (20 mg total) by mouth daily.  0    Levothyroxine Sodium 150 MCG Oral Tab Take 1 tablet (150 mcg total) by mouth daily.  0       Review of Systems:  Pertinent items are noted in HPI.      Objective:   There were no vitals taken for this visit. Estimated body mass index is 43.4 kg/m² as calculated from the following:    Height as of 4/7/24: 6' (1.829 m).    Weight as of 4/7/24: 320 lb (145.2 kg).      Instruction on injection technique and educational materials provided and reviewed in detail. Pt able to inject 0.04 mL without difficulty with excellent response.         Laboratory Data:  Lab Results   Component Value Date    WBC 14.6 (H) 04/07/2024    HGB 15.5 04/07/2024    .0  04/07/2024     Lab Results   Component Value Date     04/07/2024    K 3.8 04/07/2024     04/07/2024    CO2 26.0 04/07/2024    BUN 12 04/07/2024     (H) 04/07/2024    AST 19 04/07/2024    ALT 35 04/07/2024    TP 7.5 04/07/2024    ALB 4.3 04/07/2024    CA 9.2 04/07/2024       Urinalysis Results (last three years):  Recent Labs     04/25/23  0811 08/29/23  0807 04/07/24  1753   COLORUR  --   --  Light-Yellow   CLARITY  --   --  Clear   SPECGRAVITY 1.025 1.030 1.018   PHURINE 6.5 6.0 6.0   PROUR  --   --  Negative   GLUUR  --   --  Normal   KETUR  --   --  20*   BILUR  --   --  Negative   BLOODURINE  --   --  Negative   NITRITE Negative Negative Negative   UROBILINOGEN  --   --  Normal   LEUUR  --   --  Negative       Urine Culture Results (last three years):  No results found for: \"URINECUL\"    Imaging  No results found.    Assessment & Plan:   Erectile dysfunction    Pt counseled on risk of bleeding and priapism. Pt instructed that if he needs to increase dose he should not increase by more than 0.02 mL increments at a time. Pt also instructed to take sudafed PO if erection does not resolve after 1.5 hours.  Pt aware to call or go to ER if prolonged erection occurs.    The above impression and plan were discussed in detail with the patient who verbalized understanding and all questions were answered.  A total of 20 minutes were spent on the visit including chart review in preparation for the visit, time with the patient, placing orders and communication with other providers where appropriate.      Inez Khan PA-C, 12/6/2024

## 2024-12-09 RX ORDER — TADALAFIL 5 MG/1
5 TABLET ORAL DAILY
Qty: 30 TABLET | Refills: 5 | Status: SHIPPED | OUTPATIENT
Start: 2024-12-09

## 2025-01-14 ENCOUNTER — PATIENT MESSAGE (OUTPATIENT)
Dept: SURGERY | Facility: CLINIC | Age: 70
End: 2025-01-14

## 2025-03-09 ENCOUNTER — PATIENT MESSAGE (OUTPATIENT)
Dept: SURGERY | Facility: CLINIC | Age: 70
End: 2025-03-09

## 2025-03-10 ENCOUNTER — TELEPHONE (OUTPATIENT)
Dept: SURGERY | Facility: CLINIC | Age: 70
End: 2025-03-10

## 2025-03-10 NOTE — TELEPHONE ENCOUNTER
RN carried out the order and faxed the Trimix order to [facility] today, 3/10/25    Trimix #8  Inject 4 units intracavernosally as instructed  Increase or decreased by 2  units until desired effect achieved. Maximum dose 20 units     Dispense: 1 month supply   Refills: PRN  Syringe: 1cc: 31 gauge x 5/16\" insulin syringes  Qty: 5 ml    May use 3-4 times weekly

## 2025-03-25 ENCOUNTER — PATIENT MESSAGE (OUTPATIENT)
Dept: SURGERY | Facility: CLINIC | Age: 70
End: 2025-03-25

## 2025-04-17 LAB — PSA, TOTAL: 0.25 NG/ML

## 2025-05-14 ENCOUNTER — OFFICE VISIT (OUTPATIENT)
Facility: LOCATION | Age: 70
End: 2025-05-14
Payer: MEDICARE

## 2025-05-14 VITALS
HEART RATE: 76 BPM | OXYGEN SATURATION: 97 % | RESPIRATION RATE: 18 BRPM | DIASTOLIC BLOOD PRESSURE: 70 MMHG | TEMPERATURE: 97 F | SYSTOLIC BLOOD PRESSURE: 115 MMHG

## 2025-05-14 DIAGNOSIS — K64.8 INTERNAL HEMORRHOIDS WITH COMPLICATION: Primary | ICD-10-CM

## 2025-05-14 PROCEDURE — 99213 OFFICE O/P EST LOW 20 MIN: CPT | Performed by: SURGERY

## 2025-05-14 PROCEDURE — 46221 LIGATION OF HEMORRHOID(S): CPT | Performed by: SURGERY

## 2025-05-14 RX ORDER — TIRZEPATIDE 7.5 MG/.5ML
INJECTION, SOLUTION SUBCUTANEOUS
COMMUNITY
Start: 2025-04-21

## 2025-05-14 NOTE — PROGRESS NOTES
Follow Up Visit Note       Active Problems      1. Internal hemorrhoids with complication          Chief Complaint   Chief Complaint   Patient presents with    [facility]     EP- Hemorrhoid Consult- discuss hemorrhoid banding and possible surgery, denies rectal pain or bleeding          History of Present Illness    The patient presents for rubber band ligation of internal hemorrhoids.  The patient has no new complaints since our last encounter.     Allergies  Miguel Angel has no known allergies.    Past Medical / Surgical / Social / Family History    The past medical and past surgical history have been reviewed by me today.    Past Medical History[1]  Past Surgical History[2]    The family history and social history have been reviewed by me today.    Family History[3]  Social Hx on file[4]   Current Medications[5]     Review of Systems  The Review of Systems has been reviewed by me during today.  Review of Systems     Physical Findings   /70   Pulse 76   Temp 97.1 °F (36.2 °C) (Temporal)   Resp 18   SpO2 97%   Pre op diagnosis: Internal Hemorrhoids    Post op diagnosis: Same    Procedure: Anoscopy with O-ring Rubber Band Ligation of Internal Hemorrhoids    History of present illness:   This patient has internal hemorrhoids that are symptomatic.    Operative findings:   The internal hemorrhoid was successfully ligated in the standard fashion with an O-ring ligator.      Operative summary:  The patient was draped and exposed in the usual fashion.    A medical assistant was present at all times.    External visualization of the perineum and gluteal cleft was performed.    Digital exam was performed with a well lubricated examining finger.    Diagnostic Anoscopy was performed with lubrication.    The anal canal was well visualized.    An internal hemorrhoid was identified and well visualized.    The internal hemorrhoid was grasped well above the dentate line with the grasper.    The internal hemorrhoid was  pulled within the O-ring ligator.    The O-ring ligator was fired without complication.    The patient tolerated the procedure and was observed in our office for at least 5 minutes prior to discharge.    All findings are listed in the physical exam section of this note.           Assessment   1. Internal hemorrhoids with complication        Plan     Successful treatment of internal hemorrhoid of the left lateral position by rubber band ligation.    The care plan is discussed with the patient who voiced understanding.    Dietary, activity, and exercise recommendations were discussed with the patient.    The patient is encouraged to avoid straining, continue dietary fiber supplementation, stool softeners, and adequate hydration.    Follow-up in 2 weeks for continued treatment of internal hemorrhoids.    The patient was provided ample opportunity to ask questions.  All of the patient's questions were answered in detail.  The patient voiced understanding of the care plan.       No orders of the defined types were placed in this encounter.      Imaging & Referrals   None    Follow Up  No follow-ups on file.    Aleksandr Rodrigues MD           [1]   Past Medical History:   Arthritis    Back pain    Disorder of prostate    Disorder of thyroid    Hearing impairment    Bilateral hearing aids    Hearing loss    wear hearing aids    Hemorrhoids    Hx of motion sickness    Hyperlipidemia    Hypothyroid    Morbid obesity (HCC)    SKY (obstructive sleep apnea)    CPAP 12    SKY (obstructive sleep apnea)    AHI 71 Supine  non-supine AHI 50 Sao2 Ricardo 85%    Osteoarthritis    Pain in joints    Presence of other cardiac implants and grafts    Prostate cancer (HCC)    Sleep apnea    cpap    Wears glasses   [2]   Past Surgical History:  Procedure Laterality Date    Colonoscopy N/A 3/1/2022    Procedure: COLONOSCOPY;  Surgeon: Fransico Johnson MD;  Location:  ENDOSCOPY    Knee replacement surgery Left 12/2005    Laparoscopy,  surgical prostatectomy, retropubic radical, w/nerve sparing  12/2020    Proton Therapy    Tonsillectomy      T&A   [3]   Family History  Problem Relation Age of Onset    Cancer Father         Renal    Cancer Sister     Neurological Disorder Brother         MS   [4]   Social History  Socioeconomic History    Marital status:    Tobacco Use    Smoking status: Never    Smokeless tobacco: Never   Vaping Use    Vaping status: Never Used   Substance and Sexual Activity    Alcohol use: Yes     Alcohol/week: 6.0 standard drinks of alcohol     Types: 1 Glasses of wine, 2 Cans of beer, 3 Standard drinks or equivalent per week     Comment: social    Drug use: No   Other Topics Concern    Caffeine Concern Yes     Comment: 2 cup daily coffee    Exercise Yes     Comment: yoga   [5]   Current Outpatient Medications   Medication Sig Dispense Refill    ZEPBOUND 7.5 MG/0.5ML Subcutaneous Solution Auto-injector every 7 days.      tadalafil 5 MG Oral Tab Take 1 tablet (5 mg total) by mouth daily. 30 tablet 5    amoxicillin clavulanate 875-125 MG Oral Tab Take 1 tablet by mouth 2 (two) times daily with meals.      tamsulosin 0.4 MG Oral Cap Take 1 capsule (0.4 mg total) by mouth in the morning and 1 capsule (0.4 mg total) before bedtime.      celecoxib 200 MG Oral Cap Take 1 capsule (200 mg total) by mouth 2 (two) times daily. Pt usually takes once daily      Rosuvastatin Calcium 10 MG Oral Tab   2    MetFORMIN HCl 500 MG Oral Tab Take 1 tablet (500 mg total) by mouth 2 (two) times daily with meals.      furosemide 20 MG Oral Tab Take 1 tablet (20 mg total) by mouth daily.  0    Levothyroxine Sodium 150 MCG Oral Tab Take 1 tablet (150 mcg total) by mouth daily.  0

## 2025-05-14 NOTE — H&P
New Patient Visit Note       Active Problems      1. Internal hemorrhoids with complication        Chief Complaint   Chief Complaint   Patient presents with    [facility]     EP- Hemorrhoid Consult- discuss hemorrhoid banding and possible surgery, denies rectal pain or bleeding        History of Present Illness     Patient presents the request of his primary care physician for evaluation of hemorrhoid.  The patient states he is previously noted hemorrhoidal tissue protruding from his anus on the left-hand side requiring manual reduction.  There is occasional discomfort and drainage from the area and the patient wishes definitive management.  No other complaints offered    The patient denies fever, chills, chest pain, shortness of breath, dyspnea. The patient also denies hematemesis, melena, or hematochezia. The patient denies change in bowel or bladder habits. There is no complaint of hematuria or dysuria.    I discussed the risk, benefits, alternatives of surgery.  I discussed the anticipated postoperative recovery including dietary, activity, exercise, and lifestyle recommendations.  The patient's questions regarding surgical procedure were answered and the patient voiced understanding of the care plan.    Allergies  Miguel Angel has no known allergies.    Past Medical / Surgical / Social / Family History    The past medical and past surgical history have been reviewed by me today.    Past Medical History[1]  Past Surgical History[2]    The family history and social history have been reviewed by me today.    Family History[3]  Social Hx on file[4]   Medications - Current[5]      Review of Systems  The Review of Systems has been reviewed by me during today.  Review of Systems    Physical Findings   /70   Pulse 76   Temp 97.1 °F (36.2 °C) (Temporal)   Resp 18   SpO2 97%   Physical Exam  Vitals and nursing note reviewed. Exam conducted with a chaperone present.   Constitutional:       General: He is not in  acute distress.     Appearance: He is well-developed.   HENT:      Head: Normocephalic and atraumatic.   Eyes:      General: No scleral icterus.  Neck:      Trachea: No tracheal deviation.   Cardiovascular:      Rate and Rhythm: Normal rate and regular rhythm.      Heart sounds: S1 normal and S2 normal. No murmur heard.  Pulmonary:      Effort: No tachypnea, accessory muscle usage or respiratory distress.      Breath sounds: No decreased breath sounds, wheezing or rhonchi.   Genitourinary:     Prostate: Not enlarged and not tender.      Rectum: Internal hemorrhoid present. No mass, tenderness, anal fissure or external hemorrhoid. Normal anal tone.          Comments: No Prostate Nodule  Anal Sphincter Intact  No Pruritis Ani  No Lichenification  No Abscess  No Fistula in ano  No Anterior Fissure  No Posterior Fissure    See Procedures:  Anoscopy confirms grade 3 internal hemorrhoid of left lateral position.  No other lesions in the anorectal canal now.  Rubber band ligation performed uneventfully-please see procedure note  Skin:     General: Skin is warm and dry.   Neurological:      Mental Status: He is alert and oriented to person, place, and time.   Psychiatric:         Speech: Speech normal.         Behavior: Behavior normal. Behavior is cooperative.         Thought Content: Thought content normal.         Judgment: Judgment normal.             Assessment   1. Internal hemorrhoids with complication          Plan     The patient consented to-office rubber band ligation of internal hemorrhoid which was completed uneventfully.  Please see procedure note for details.    The wei-operative care plan was discussed with the patient, who voices understanding.  Activity and lifting recommendations were discussed in length.     The risks, benefits, and alternatives to the procedure were explained to the patient.  The risks explained include, but are not limited to, bleeding, infection, pain wound complications, recurrence,  incorrect diagnosis, injury to adjacent organs and structures. We also discussed the possibile need for further therapeutic, diagnostic, or surgical intervention.  The patient voiced understanding, and after all questions were answered to the patient's satisfaction, the patient provided willing and informed consent to proceed.     No orders of the defined types were placed in this encounter.      Imaging & Referrals   None    Follow Up  No follow-ups on file.    Aleksandr Rodrigues MD       [1]   Past Medical History:   Arthritis    Back pain    Disorder of prostate    Disorder of thyroid    Hearing impairment    Bilateral hearing aids    Hearing loss    wear hearing aids    Hemorrhoids    Hx of motion sickness    Hyperlipidemia    Hypothyroid    Morbid obesity (HCC)    SKY (obstructive sleep apnea)    CPAP 12    SKY (obstructive sleep apnea)    AHI 71 Supine  non-supine AHI 50 Sao2 Ricardo 85%    Osteoarthritis    Pain in joints    Presence of other cardiac implants and grafts    Prostate cancer (HCC)    Sleep apnea    cpap    Wears glasses   [2]   Past Surgical History:  Procedure Laterality Date    Colonoscopy N/A 3/1/2022    Procedure: COLONOSCOPY;  Surgeon: Fransico Johnson MD;  Location:  ENDOSCOPY    Knee replacement surgery Left 12/2005    Laparoscopy, surgical prostatectomy, retropubic radical, w/nerve sparing  12/2020    Proton Therapy    Tonsillectomy      T&A   [3]   Family History  Problem Relation Age of Onset    Cancer Father         Renal    Cancer Sister     Neurological Disorder Brother         MS   [4]   Social History  Socioeconomic History    Marital status:    Tobacco Use    Smoking status: Never    Smokeless tobacco: Never   Vaping Use    Vaping status: Never Used   Substance and Sexual Activity    Alcohol use: Yes     Alcohol/week: 6.0 standard drinks of alcohol     Types: 1 Glasses of wine, 2 Cans of beer, 3 Standard drinks or equivalent per week     Comment: social    Drug  use: No   Other Topics Concern    Caffeine Concern Yes     Comment: 2 cup daily coffee    Exercise Yes     Comment: yoga   [5]   Current Outpatient Medications:     ZEPBOUND 7.5 MG/0.5ML Subcutaneous Solution Auto-injector, every 7 days., Disp: , Rfl:     tadalafil 5 MG Oral Tab, Take 1 tablet (5 mg total) by mouth daily., Disp: 30 tablet, Rfl: 5    amoxicillin clavulanate 875-125 MG Oral Tab, Take 1 tablet by mouth 2 (two) times daily with meals., Disp: , Rfl:     tamsulosin 0.4 MG Oral Cap, Take 1 capsule (0.4 mg total) by mouth in the morning and 1 capsule (0.4 mg total) before bedtime., Disp: , Rfl:     celecoxib 200 MG Oral Cap, Take 1 capsule (200 mg total) by mouth 2 (two) times daily. Pt usually takes once daily, Disp: , Rfl:     Rosuvastatin Calcium 10 MG Oral Tab, , Disp: , Rfl: 2    MetFORMIN HCl 500 MG Oral Tab, Take 1 tablet (500 mg total) by mouth 2 (two) times daily with meals., Disp: , Rfl:     furosemide 20 MG Oral Tab, Take 1 tablet (20 mg total) by mouth daily., Disp: , Rfl: 0    Levothyroxine Sodium 150 MCG Oral Tab, Take 1 tablet (150 mcg total) by mouth daily., Disp: , Rfl: 0

## 2025-06-04 ENCOUNTER — OFFICE VISIT (OUTPATIENT)
Facility: LOCATION | Age: 70
End: 2025-06-04
Payer: MEDICARE

## 2025-06-04 VITALS
TEMPERATURE: 98 F | HEART RATE: 74 BPM | SYSTOLIC BLOOD PRESSURE: 137 MMHG | RESPIRATION RATE: 20 BRPM | DIASTOLIC BLOOD PRESSURE: 86 MMHG | OXYGEN SATURATION: 96 %

## 2025-06-04 DIAGNOSIS — K64.8 INTERNAL HEMORRHOIDS WITH COMPLICATION: Primary | ICD-10-CM

## 2025-06-04 PROCEDURE — 46221 LIGATION OF HEMORRHOID(S): CPT | Performed by: SURGERY

## 2025-06-04 NOTE — PROGRESS NOTES
Follow Up Visit Note       Active Problems      No diagnosis found.      Chief Complaint   Chief Complaint   Patient presents with    Hemorrhoid Banding     EP- 2nd Banding          History of Present Illness        Allergies  Miguel Angel has no known allergies.    Past Medical / Surgical / Social / Family History    The past medical and past surgical history have been reviewed by me today.    Past Medical History[1]  Past Surgical History[2]    The family history and social history have been reviewed by me today.    Family History[3]  Social Hx on file[4]   Medications - Current[5]     Review of Systems  The Review of Systems has been reviewed by me during today.  Review of Systems   Constitutional:  Negative for chills, diaphoresis, fatigue, fever and unexpected weight change.   HENT:  Negative for hearing loss, nosebleeds, sore throat and trouble swallowing.    Respiratory:  Negative for apnea, cough, shortness of breath and wheezing.    Cardiovascular:  Negative for chest pain, palpitations and leg swelling.   Gastrointestinal:  Negative for abdominal distention, abdominal pain, anal bleeding, blood in stool, constipation, diarrhea, nausea and vomiting.   Genitourinary:  Negative for difficulty urinating, dysuria, frequency and urgency.   Musculoskeletal:  Negative for arthralgias and myalgias.   Skin:  Negative for color change and rash.   Neurological:  Negative for tremors, syncope and weakness.   Hematological:  Negative for adenopathy. Does not bruise/bleed easily.   Psychiatric/Behavioral:  Negative for behavioral problems and sleep disturbance.         Physical Findings   There were no vitals taken for this visit.  Physical Exam     Assessment   No diagnosis found.    Plan        No orders of the defined types were placed in this encounter.      Imaging & Referrals   None    Follow Up  No follow-ups on file.    Aleksandr Rodrigues MD           [1]   Past Medical History:   Arthritis    Back pain    Disorder of  prostate    Disorder of thyroid    Hearing impairment    Bilateral hearing aids    Hearing loss    wear hearing aids    Hemorrhoids    Hx of motion sickness    Hyperlipidemia    Hypothyroid    Morbid obesity (HCC)    SKY (obstructive sleep apnea)    CPAP 12    SKY (obstructive sleep apnea)    AHI 71 Supine  non-supine AHI 50 Sao2 Ricardo 85%    Osteoarthritis    Pain in joints    Presence of other cardiac implants and grafts    Prostate cancer (HCC)    Sleep apnea    cpap    Wears glasses   [2]   Past Surgical History:  Procedure Laterality Date    Colonoscopy N/A 3/1/2022    Procedure: COLONOSCOPY;  Surgeon: Fransico Johnson MD;  Location:  ENDOSCOPY    Knee replacement surgery Left 12/2005    Laparoscopy, surgical prostatectomy, retropubic radical, w/nerve sparing  12/2020    Proton Therapy    Tonsillectomy      T&A   [3]   Family History  Problem Relation Age of Onset    Cancer Father         Renal    Cancer Sister     Neurological Disorder Brother         MS   [4]   Social History  Socioeconomic History    Marital status:    Tobacco Use    Smoking status: Never    Smokeless tobacco: Never   Vaping Use    Vaping status: Never Used   Substance and Sexual Activity    Alcohol use: Yes     Alcohol/week: 6.0 standard drinks of alcohol     Types: 1 Glasses of wine, 2 Cans of beer, 3 Standard drinks or equivalent per week     Comment: social    Drug use: No   Other Topics Concern    Caffeine Concern Yes     Comment: 2 cup daily coffee    Exercise Yes     Comment: yoga   [5]   Current Outpatient Medications:     ZEPBOUND 7.5 MG/0.5ML Subcutaneous Solution Auto-injector, every 7 days., Disp: , Rfl:     tadalafil 5 MG Oral Tab, Take 1 tablet (5 mg total) by mouth daily., Disp: 30 tablet, Rfl: 5    amoxicillin clavulanate 875-125 MG Oral Tab, Take 1 tablet by mouth 2 (two) times daily with meals., Disp: , Rfl:     tamsulosin 0.4 MG Oral Cap, Take 1 capsule (0.4 mg total) by mouth in the morning and 1 capsule  (0.4 mg total) before bedtime., Disp: , Rfl:     celecoxib 200 MG Oral Cap, Take 1 capsule (200 mg total) by mouth 2 (two) times daily. Pt usually takes once daily, Disp: , Rfl:     Rosuvastatin Calcium 10 MG Oral Tab, , Disp: , Rfl: 2    MetFORMIN HCl 500 MG Oral Tab, Take 1 tablet (500 mg total) by mouth 2 (two) times daily with meals., Disp: , Rfl:     furosemide 20 MG Oral Tab, Take 1 tablet (20 mg total) by mouth daily., Disp: , Rfl: 0    Levothyroxine Sodium 150 MCG Oral Tab, Take 1 tablet (150 mcg total) by mouth daily., Disp: , Rfl: 0

## 2025-06-04 NOTE — PROGRESS NOTES
Follow Up Visit Note       Active Problems      1. Internal hemorrhoids with complication          Chief Complaint   Chief Complaint   Patient presents with    Hemorrhoid Banding     EP- 2nd Banding          History of Present Illness    The patient presents for rubber band ligation of internal hemorrhoids.  The patient has no new complaints since our last encounter.     Allergies  Miguel Angel has no known allergies.    Past Medical / Surgical / Social / Family History    The past medical and past surgical history have been reviewed by me today.    Past Medical History[1]  Past Surgical History[2]    The family history and social history have been reviewed by me today.    Family History[3]  Social Hx on file[4]   Current Medications[5]     Review of Systems  The Review of Systems has been reviewed by me during today.  Review of Systems     Physical Findings   /86   Pulse 74   Temp 97.6 °F (36.4 °C) (Temporal)   Resp 20   SpO2 96%   Pre op diagnosis: Internal Hemorrhoids    Post op diagnosis: Same    Procedure: Anoscopy with O-ring Rubber Band Ligation of Internal Hemorrhoids    History of present illness:   This patient has internal hemorrhoids that are symptomatic.    Operative findings:   The internal hemorrhoid was successfully ligated in the standard fashion with an O-ring ligator.      Operative summary:  The patient was draped and exposed in the usual fashion.    A medical assistant was present at all times.    External visualization of the perineum and gluteal cleft was performed.    Digital exam was performed with a well lubricated examining finger.    Diagnostic Anoscopy was performed with lubrication.    The anal canal was well visualized.    An internal hemorrhoid was identified and well visualized.    The internal hemorrhoid was grasped well above the dentate line with the grasper.    The internal hemorrhoid was pulled within the O-ring ligator.    The O-ring ligator was fired without  complication.    The patient tolerated the procedure and was observed in our office for at least 5 minutes prior to discharge.    All findings are listed in the physical exam section of this note.           Assessment   1. Internal hemorrhoids with complication        Plan     Successful treatment of internal hemorrhoid by rubber band ligation.    The care plan is discussed with the patient who voiced understanding.    Dietary, activity, and exercise recommendations were discussed with the patient.    The patient is encouraged to avoid straining, continue dietary fiber supplementation, stool softeners, and adequate hydration.    Follow-up in 2 weeks for continued treatment of internal hemorrhoids.    The patient was provided ample opportunity to ask questions.  All of the patient's questions were answered in detail.  The patient voiced understanding of the care plan.       No orders of the defined types were placed in this encounter.      Imaging & Referrals   None    Follow Up  No follow-ups on file.    Aleksandr Rodrigues MD           [1]   Past Medical History:   Arthritis    Back pain    Disorder of prostate    Disorder of thyroid    Hearing impairment    Bilateral hearing aids    Hearing loss    wear hearing aids    Hemorrhoids    Hx of motion sickness    Hyperlipidemia    Hypothyroid    Morbid obesity (HCC)    SKY (obstructive sleep apnea)    CPAP 12    SKY (obstructive sleep apnea)    AHI 71 Supine  non-supine AHI 50 Sao2 Ricardo 85%    Osteoarthritis    Pain in joints    Presence of other cardiac implants and grafts    Prostate cancer (HCC)    Sleep apnea    cpap    Wears glasses   [2]   Past Surgical History:  Procedure Laterality Date    Colonoscopy N/A 3/1/2022    Procedure: COLONOSCOPY;  Surgeon: Fransico Johnson MD;  Location:  ENDOSCOPY    Knee replacement surgery Left 12/2005    Laparoscopy, surgical prostatectomy, retropubic radical, w/nerve sparing  12/2020    Proton Therapy    Tonsillectomy       T&A   [3]   Family History  Problem Relation Age of Onset    Cancer Father         Renal    Cancer Sister     Neurological Disorder Brother         MS   [4]   Social History  Socioeconomic History    Marital status:    Tobacco Use    Smoking status: Never    Smokeless tobacco: Never   Vaping Use    Vaping status: Never Used   Substance and Sexual Activity    Alcohol use: Yes     Alcohol/week: 6.0 standard drinks of alcohol     Types: 1 Glasses of wine, 2 Cans of beer, 3 Standard drinks or equivalent per week     Comment: social    Drug use: No   Other Topics Concern    Caffeine Concern Yes     Comment: 2 cup daily coffee    Exercise Yes     Comment: yoga   [5]   Current Outpatient Medications   Medication Sig Dispense Refill    ZEPBOUND 7.5 MG/0.5ML Subcutaneous Solution Auto-injector every 7 days.      tadalafil 5 MG Oral Tab Take 1 tablet (5 mg total) by mouth daily. 30 tablet 5    amoxicillin clavulanate 875-125 MG Oral Tab Take 1 tablet by mouth 2 (two) times daily with meals.      tamsulosin 0.4 MG Oral Cap Take 1 capsule (0.4 mg total) by mouth in the morning and 1 capsule (0.4 mg total) before bedtime.      celecoxib 200 MG Oral Cap Take 1 capsule (200 mg total) by mouth 2 (two) times daily. Pt usually takes once daily      Rosuvastatin Calcium 10 MG Oral Tab   2    MetFORMIN HCl 500 MG Oral Tab Take 1 tablet (500 mg total) by mouth 2 (two) times daily with meals.      furosemide 20 MG Oral Tab Take 1 tablet (20 mg total) by mouth daily.  0    Levothyroxine Sodium 150 MCG Oral Tab Take 1 tablet (150 mcg total) by mouth daily.  0

## 2025-06-18 ENCOUNTER — OFFICE VISIT (OUTPATIENT)
Facility: LOCATION | Age: 70
End: 2025-06-18
Payer: MEDICARE

## 2025-06-18 VITALS
HEART RATE: 64 BPM | TEMPERATURE: 97 F | DIASTOLIC BLOOD PRESSURE: 81 MMHG | OXYGEN SATURATION: 94 % | SYSTOLIC BLOOD PRESSURE: 132 MMHG

## 2025-06-18 DIAGNOSIS — K64.8 INTERNAL HEMORRHOIDS WITH COMPLICATION: Primary | ICD-10-CM

## 2025-06-18 PROCEDURE — 46221 LIGATION OF HEMORRHOID(S): CPT | Performed by: SURGERY

## 2025-06-18 RX ORDER — PANTOPRAZOLE SODIUM 40 MG/1
40 TABLET, DELAYED RELEASE ORAL
COMMUNITY
Start: 2025-06-02

## 2025-06-18 NOTE — PROGRESS NOTES
Follow Up Visit Note       Active Problems      1. Internal hemorrhoids with complication          Chief Complaint   Chief Complaint   Patient presents with    Procedure     PROCEDURE - 3rd hemorrhoid banding- No issues with last banding          History of Present Illness    The patient presents for rubber band ligation of internal hemorrhoids.  The patient has no new complaints since our last encounter.     Allergies  Miguel Angel has no known allergies.    Past Medical / Surgical / Social / Family History    The past medical and past surgical history have been reviewed by me today.    Past Medical History[1]  Past Surgical History[2]    The family history and social history have been reviewed by me today.    Family History[3]  Social Hx on file[4]   Current Medications[5]     Review of Systems  The Review of Systems has been reviewed by me during today.  Review of Systems     Physical Findings   /81 (BP Location: Left arm, Patient Position: Sitting, Cuff Size: large)   Pulse 64   Temp 97.3 °F (36.3 °C) (Temporal)   SpO2 94%   Pre op diagnosis: Internal Hemorrhoids    Post op diagnosis: Same    Procedure: Anoscopy with O-ring Rubber Band Ligation of Internal Hemorrhoids    History of present illness:   This patient has internal hemorrhoids that are symptomatic.    Operative findings:   The internal hemorrhoid was successfully ligated in the standard fashion with an O-ring ligator.      Operative summary:  The patient was draped and exposed in the usual fashion.    A medical assistant was present at all times.    External visualization of the perineum and gluteal cleft was performed.    Digital exam was performed with a well lubricated examining finger.    Diagnostic Anoscopy was performed with lubrication.    The anal canal was well visualized.    An internal hemorrhoid was identified and well visualized.    The internal hemorrhoid was grasped well above the dentate line with the grasper.    The internal  hemorrhoid was pulled within the O-ring ligator.    The O-ring ligator was fired without complication.    The patient tolerated the procedure and was observed in our office for at least 5 minutes prior to discharge.    All findings are listed in the physical exam section of this note.           Assessment   1. Internal hemorrhoids with complication        Plan     Successful treatment of internal hemorrhoid by rubber band ligation.    The care plan is discussed with the patient who voiced understanding.    Dietary, activity, and exercise recommendations were discussed with the patient.    The patient is encouraged to avoid straining, continue dietary fiber supplementation, stool softeners, and adequate hydration.    Follow-up in 2 weeks for continued treatment of internal hemorrhoids.    The patient was provided ample opportunity to ask questions.  All of the patient's questions were answered in detail.  The patient voiced understanding of the care plan.       No orders of the defined types were placed in this encounter.      Imaging & Referrals   None    Follow Up  No follow-ups on file.    Aleksandr Rodrigues MD           [1]   Past Medical History:   Arthritis    Back pain    Disorder of prostate    Disorder of thyroid    Exposure to medical diagnostic radiation    Proton Therapy    Hearing impairment    Bilateral hearing aids    Hearing loss    wear hearing aids    Hemorrhoids    Hx of motion sickness    Hyperlipidemia    Hypothyroid    Hypothyroidism    Morbid obesity (HCC)    SKY (obstructive sleep apnea)    CPAP 12    SKY (obstructive sleep apnea)    AHI 71 Supine  non-supine AHI 50 Sao2 Ricardo 85%    Osteoarthritis    Pain    knees    Pain in joints    Presence of other cardiac implants and grafts    Prostate cancer (HCC)    Sleep apnea    cpap    Wears glasses   [2]   Past Surgical History:  Procedure Laterality Date    Appendectomy  March 2024    Colonoscopy N/A 03/01/2022    Procedure: COLONOSCOPY;   Surgeon: Fransico Johnson MD;  Location:  ENDOSCOPY    Colonoscopy  3/1/2022    Knee replacement surgery Left 12/2005    Laparoscopy, surgical prostatectomy, retropubic radical, w/nerve sparing  12/2020    Proton Therapy    Tonsillectomy      T&A    Vasectomy     [3]   Family History  Problem Relation Age of Onset    Heart Disorder Mother     Cancer Father         Renal    Obesity Father     Cancer Sister     Breast Cancer Sister     Obesity Sister     Neurological Disorder Brother         MS    Cancer Brother     Obesity Brother     Obesity Brother     Musculo-skelatal Disorder Brother     Obesity Brother     Breast Cancer Daughter    [4]   Social History  Socioeconomic History    Marital status:    Tobacco Use    Smoking status: Never    Smokeless tobacco: Never   Vaping Use    Vaping status: Never Used   Substance and Sexual Activity    Alcohol use: Yes     Alcohol/week: 3.0 standard drinks of alcohol     Types: 2 Cans of beer, 1 Standard drinks or equivalent per week     Comment: social    Drug use: No   Other Topics Concern    Caffeine Concern No    Stress Concern No    Weight Concern Yes    Special Diet No    Exercise No    Seat Belt No   [5]   Current Outpatient Medications   Medication Sig Dispense Refill    pantoprazole 40 MG Oral Tab EC Take 1 tablet (40 mg total) by mouth before breakfast.      amoxicillin clavulanate 875-125 MG Oral Tab Take 1 tablet by mouth in the morning and 1 tablet in the evening. Take with meals.      tamsulosin 0.4 MG Oral Cap Take 1 capsule (0.4 mg total) by mouth in the morning and 1 capsule (0.4 mg total) before bedtime.      celecoxib 200 MG Oral Cap Take 1 capsule (200 mg total) by mouth in the morning and 1 capsule (200 mg total) before bedtime. Pt usually takes once daily.      Rosuvastatin Calcium 10 MG Oral Tab   2    furosemide 20 MG Oral Tab Take 1 tablet (20 mg total) by mouth 2 (two) times daily as needed.  0    Levothyroxine Sodium 150 MCG Oral Tab Take 1  tablet (150 mcg total) by mouth in the morning.  0

## 2025-07-14 ENCOUNTER — TELEPHONE (OUTPATIENT)
Dept: SURGERY | Facility: CLINIC | Age: 70
End: 2025-07-14

## 2025-07-14 DIAGNOSIS — N28.1 RENAL CYST: ICD-10-CM

## 2025-07-14 DIAGNOSIS — N20.0 NEPHROLITHIASIS: Primary | ICD-10-CM

## 2025-07-14 DIAGNOSIS — C61 PROSTATE CANCER (HCC): ICD-10-CM

## 2025-07-14 NOTE — TELEPHONE ENCOUNTER
Pt called.  Unable to get the ultrasound done prior to appointment scheduled on 25.   Pt has rescheduled to 10-31-25.  Order for ultrasound .  Will need new order.  Please call pt

## 2025-08-04 ENCOUNTER — TELEPHONE (OUTPATIENT)
Dept: PAIN CLINIC | Facility: CLINIC | Age: 70
End: 2025-08-04

## 2025-08-04 ENCOUNTER — OFFICE VISIT (OUTPATIENT)
Dept: PAIN CLINIC | Facility: CLINIC | Age: 70
End: 2025-08-04

## 2025-08-04 VITALS
WEIGHT: 310 LBS | DIASTOLIC BLOOD PRESSURE: 72 MMHG | HEIGHT: 72 IN | SYSTOLIC BLOOD PRESSURE: 118 MMHG | OXYGEN SATURATION: 92 % | BODY MASS INDEX: 41.99 KG/M2 | HEART RATE: 72 BPM

## 2025-08-04 DIAGNOSIS — M54.6 CHRONIC RIGHT-SIDED THORACIC BACK PAIN: Primary | ICD-10-CM

## 2025-08-04 DIAGNOSIS — G58.8 INTERCOSTAL NEURALGIA: Primary | ICD-10-CM

## 2025-08-04 DIAGNOSIS — G89.29 CHRONIC RIGHT-SIDED THORACIC BACK PAIN: Primary | ICD-10-CM

## 2025-08-04 PROCEDURE — 99204 OFFICE O/P NEW MOD 45 MIN: CPT | Performed by: ANESTHESIOLOGY

## 2025-08-11 ENCOUNTER — PATIENT MESSAGE (OUTPATIENT)
Dept: SURGERY | Facility: CLINIC | Age: 70
End: 2025-08-11

## 2025-08-11 DIAGNOSIS — C61 PROSTATE CANCER (HCC): Primary | ICD-10-CM

## 2025-08-19 ENCOUNTER — OFFICE VISIT (OUTPATIENT)
Facility: LOCATION | Age: 70
End: 2025-08-19

## 2025-08-19 VITALS
HEART RATE: 63 BPM | OXYGEN SATURATION: 93 % | TEMPERATURE: 98 F | SYSTOLIC BLOOD PRESSURE: 124 MMHG | DIASTOLIC BLOOD PRESSURE: 80 MMHG

## 2025-08-19 DIAGNOSIS — K64.8 INTERNAL HEMORRHOIDS WITH COMPLICATION: Primary | ICD-10-CM

## 2025-08-19 PROCEDURE — 46221 LIGATION OF HEMORRHOID(S): CPT | Performed by: SURGERY

## 2025-08-19 RX ORDER — BUPROPION HYDROCHLORIDE 150 MG/1
TABLET ORAL
COMMUNITY
Start: 2025-08-14

## 2025-08-19 RX ORDER — DEXMETHYLPHENIDATE HYDROCHLORIDE 10 MG/1
TABLET ORAL
COMMUNITY
Start: 2025-08-14

## 2025-08-21 ENCOUNTER — APPOINTMENT (OUTPATIENT)
Dept: GENERAL RADIOLOGY | Facility: HOSPITAL | Age: 70
End: 2025-08-21
Attending: ANESTHESIOLOGY

## 2025-08-21 ENCOUNTER — HOSPITAL ENCOUNTER (OUTPATIENT)
Facility: HOSPITAL | Age: 70
Setting detail: HOSPITAL OUTPATIENT SURGERY
Discharge: HOME | End: 2025-08-21
Attending: ANESTHESIOLOGY | Admitting: ANESTHESIOLOGY

## 2025-08-21 VITALS
WEIGHT: 310 LBS | BODY MASS INDEX: 41.99 KG/M2 | OXYGEN SATURATION: 97 % | RESPIRATION RATE: 16 BRPM | SYSTOLIC BLOOD PRESSURE: 134 MMHG | DIASTOLIC BLOOD PRESSURE: 75 MMHG | HEIGHT: 72 IN | HEART RATE: 68 BPM | TEMPERATURE: 97 F

## 2025-08-21 PROBLEM — G58.8 INTERCOSTAL NEURALGIA: Status: ACTIVE | Noted: 2025-08-21

## 2025-08-21 PROCEDURE — 64420 NJX AA&/STRD NTRCOST NRV 1: CPT | Performed by: ANESTHESIOLOGY

## 2025-08-21 PROCEDURE — 64421 NJX AA&/STRD NTRCOST NRV EA: CPT | Performed by: ANESTHESIOLOGY

## 2025-08-21 RX ORDER — NALOXONE HYDROCHLORIDE 0.4 MG/ML
0.08 INJECTION, SOLUTION INTRAMUSCULAR; INTRAVENOUS; SUBCUTANEOUS AS NEEDED
Status: ACTIVE | OUTPATIENT
Start: 2025-08-21

## 2025-08-21 RX ORDER — LIDOCAINE HYDROCHLORIDE 10 MG/ML
INJECTION, SOLUTION INFILTRATION; PERINEURAL
Status: DISCONTINUED | OUTPATIENT
Start: 2025-08-21 | End: 2025-08-21

## 2025-08-21 RX ORDER — METHYLPREDNISOLONE ACETATE 40 MG/ML
INJECTION, SUSPENSION INTRA-ARTICULAR; INTRALESIONAL; INTRAMUSCULAR; SOFT TISSUE
Status: DISCONTINUED | OUTPATIENT
Start: 2025-08-21 | End: 2025-08-21

## 2025-08-21 RX ORDER — SODIUM CHLORIDE, SODIUM LACTATE, POTASSIUM CHLORIDE, CALCIUM CHLORIDE 600; 310; 30; 20 MG/100ML; MG/100ML; MG/100ML; MG/100ML
100 INJECTION, SOLUTION INTRAVENOUS CONTINUOUS
Status: DISCONTINUED | OUTPATIENT
Start: 2025-08-21 | End: 2025-08-21

## 2025-08-21 RX ORDER — IOPAMIDOL 408 MG/ML
INJECTION, SOLUTION INTRATHECAL
Status: DISCONTINUED | OUTPATIENT
Start: 2025-08-21 | End: 2025-08-21

## 2025-08-21 RX ORDER — ONDANSETRON 2 MG/ML
4 INJECTION INTRAMUSCULAR; INTRAVENOUS ONCE AS NEEDED
Status: DISCONTINUED | OUTPATIENT
Start: 2025-08-21 | End: 2025-08-21

## 2025-08-21 RX ORDER — BUPIVACAINE HYDROCHLORIDE 5 MG/ML
INJECTION, SOLUTION EPIDURAL; INTRACAUDAL; PERINEURAL
Status: DISCONTINUED | OUTPATIENT
Start: 2025-08-21 | End: 2025-08-21

## 2025-08-22 ENCOUNTER — TELEPHONE (OUTPATIENT)
Dept: PAIN CLINIC | Facility: CLINIC | Age: 70
End: 2025-08-22

## (undated) DIAGNOSIS — M51.36 DISC DEGENERATION, LUMBAR: ICD-10-CM

## (undated) DIAGNOSIS — G89.29 CHRONIC LOW BACK PAIN: ICD-10-CM

## (undated) DIAGNOSIS — M25.562 LEFT KNEE PAIN: ICD-10-CM

## (undated) DIAGNOSIS — M17.9 OSTEOARTHRITIS OF KNEE, UNSPECIFIED: ICD-10-CM

## (undated) DIAGNOSIS — M25.561 RIGHT KNEE PAIN: ICD-10-CM

## (undated) DIAGNOSIS — M54.50 CHRONIC LOW BACK PAIN: ICD-10-CM

## (undated) DEVICE — GLOVE SURG SENSICARE SZ 7-1/2

## (undated) DEVICE — ENDOSCOPY PACK - LOWER: Brand: MEDLINE INDUSTRIES, INC.

## (undated) DEVICE — GLOVE SURG SENSICARE SZ 6-1/2

## (undated) DEVICE — PAIN TRAY: Brand: MEDLINE INDUSTRIES, INC.

## (undated) DEVICE — REMOVER DURAPREP 3M

## (undated) DEVICE — NEEDLE SPINAL 22X3-1/2 BLK

## (undated) DEVICE — POUCH SPECIMEN WIRE 6X3 250ML

## (undated) DEVICE — MARKER SKIN 2 TIP

## (undated) DEVICE — ADHESIVE SKIN TOP FOR WND CLSR DERMBND ADV

## (undated) DEVICE — BANDAID COVERLET 1X3

## (undated) DEVICE — PREP DURA SM 3M 8635

## (undated) DEVICE — LAPCLINCH GRASPER TIP, DISPOSABLE: Brand: RENEW

## (undated) DEVICE — COVER LT HNDL RIG FOR SUR CAM DISP

## (undated) DEVICE — TROCAR: Brand: KII FIOS FIRST ENTRY

## (undated) DEVICE — APPLICATOR PREP 26ML CHG 2% ISO ALC 70%

## (undated) DEVICE — VIOLET BRAIDED (POLYGLACTIN 910), SYNTHETIC ABSORBABLE SUTURE: Brand: COATED VICRYL

## (undated) DEVICE — GAMMEX® PI HYBRID SIZE 7.5, STERILE POWDER-FREE SURGICAL GLOVE, POLYISOPRENE AND NEOPRENE BLEND: Brand: GAMMEX

## (undated) DEVICE — TRAY CATH 16FR F INCL BARDX IC COMPLT CARE

## (undated) DEVICE — SUT ETHBND XL 0 30IN CT-1 NABSRB GRN 36MM 1/2

## (undated) DEVICE — ENDOPATH ULTRA VERESS INSUFFLATION NEEDLES WITH LUER LOCK CONNECTORS: Brand: ENDOPATH

## (undated) DEVICE — PACK PAIN MGMT

## (undated) DEVICE — SOLUTION IRRIG 1000ML 0.9% NACL USP BTL

## (undated) DEVICE — REMOVER LOT 4OZ N IRRIG UNSCNT SFT MOIST LIQ

## (undated) DEVICE — POWER SHELL: Brand: SIGNIA

## (undated) DEVICE — GRABBER GRASPER TIP, DISPOSABLE: Brand: RENEW

## (undated) DEVICE — FILTERLINE NASAL ADULT O2/CO2

## (undated) DEVICE — GLOVE SUR 7.5 SENSICARE PIP WHT PWD F

## (undated) DEVICE — LAPAROVUE VISIBILITY SYSTEM LAPAROSCOPIC SOLUTIONS: Brand: LAPAROVUE

## (undated) DEVICE — #11 STERILE BLADE: Brand: POLYMER COATED BLADES

## (undated) DEVICE — MARYLAND JAW LAPAROSCOPIC SEALER/DIVIDER COATED: Brand: LIGASURE

## (undated) DEVICE — Device: Brand: SUTURE PASSOR PRO

## (undated) DEVICE — GLOVE SUR 8 SENSICARE PI PIP CRM PWD F

## (undated) DEVICE — TROCAR: Brand: KII SHIELDED BLADED ACCESS SYSTEM

## (undated) DEVICE — SUT PDS II 0 L27IN ABSRB VLT L26MM CT-2

## (undated) DEVICE — GLOVE SUR 6.5 SENSICARE PIP WHT PWD F

## (undated) DEVICE — LAP CHOLE/APPY CDS-LF: Brand: MEDLINE INDUSTRIES, INC.

## (undated) DEVICE — BANDAGE ADH 1INX3IN NAT FAB N ADH PD CURAD

## (undated) DEVICE — GLOVE SURG SENSICARE SZ 7

## (undated) DEVICE — PENCIL SMK EVAC L10FT MPLR BLDE JAW OPN

## (undated) DEVICE — SYRINGE 10ML LL TIP

## (undated) DEVICE — HUNTER GASPER TIP, DISPOSABLE: Brand: RENEW

## (undated) DEVICE — Device: Brand: DEFENDO AIR/WATER/SUCTION AND BIOPSY VALVE

## (undated) DEVICE — TROCAR: Brand: KII® SLEEVE

## (undated) DEVICE — SLEEVE COMPR MD KNEE LEN SGL USE KENDALL SCD

## (undated) DEVICE — 1200CC GUARDIAN II: Brand: GUARDIAN

## (undated) DEVICE — NEEDLE SPNL 22GA L3.5IN BLK QNCKE STYL DISP

## (undated) DEVICE — ARTICULATION RELOAD WITH TRI-STAPLE TECHNOLOGY: Brand: ENDO GIA

## (undated) DEVICE — 3M™ RED DOT™ MONITORING ELECTRODE WITH FOAM TAPE AND STICKY GEL, 50/BAG, 20/CASE, 72/PLT 2570: Brand: RED DOT™

## (undated) DEVICE — SUT MCRYL 4-0 18IN PS-2 ABSRB UD 19MM 3/8 CIR

## (undated) DEVICE — GAMMEX® PI HYBRID SIZE 7, STERILE POWDER-FREE SURGICAL GLOVE, POLYISOPRENE AND NEOPRENE BLEND: Brand: GAMMEX

## (undated) DEVICE — 40580 - THE PINK PAD - ADVANCED TRENDELENBURG POSITIONING KIT: Brand: 40580 - THE PINK PAD - ADVANCED TRENDELENBURG POSITIONING KIT

## (undated) DEVICE — NEEDLE SPINAL 22X5 405148

## (undated) NOTE — LETTER
23    Patient: Haim Suazo  : 1955 Visit date: 2023    Dear  Delia Coe MD    Thank you for referring Haim Suazo to my practice. Please find my assessment and plan below. Assessment   Gallstones  (primary encounter diagnosis)      Plan     The patient is asymptomatic gallstones and no evidence of chronic cholecystitis or biliary colic now. The patient's pain exacerbation may have been a one-time episode of biliary colic or musculoskeletal strain. No surgical intervention warranted at this time based on symptoms and clinical improvement. I explained to the patient and his spouse that should symptoms recur, intensify or worsen he should return to my attention so we may revisit discussion of surgical intervention. Dietary activity exercise and lifestyle recommendations were discussed. The patient will return to his primary care physician for ongoing primary care needs. The patient was provided ample opportunity to ask questions. All of the patient's questions were answered in detail. The patient voiced understanding of the care plan.       Sincerely,       Jose Luis Maradiaga MD   CC:   No Recipients

## (undated) NOTE — LETTER
Patient Name: John Mcconnell        : 1955       Medical Record #: [id number]    CONSENT FOR PROCEDURES/SEDATION    Date: 2018       Time: 9:53 AM        1.  I authorize the performance upon John Mcconnell the following:  Right scapula

## (undated) NOTE — LETTER
25    Patient: Miguel Angel Zaragoza  : 1955 Visit date: 2025    Dear  Juan De La Torre MD    Thank you for referring Miguel Angel Zaragoza to my practice.  Please find my assessment and plan below.    Assessment   1. Internal hemorrhoids with complication        Plan     Successful treatment of internal hemorrhoid by rubber band ligation.    The care plan is discussed with the patient who voiced understanding.    Dietary, activity, and exercise recommendations were discussed with the patient.    The patient is encouraged to avoid straining, continue dietary fiber supplementation, stool softeners, and adequate hydration.    Follow-up in 2 weeks for continued treatment of internal hemorrhoids.    The patient was provided ample opportunity to ask questions.  All of the patient's questions were answered in detail.  The patient voiced understanding of the care plan.         Sincerely,       Aleksandr Rodrigues MD   CC: No Recipients

## (undated) NOTE — LETTER
[address]  [address]  Authorization for Surgical Operation and Procedure    Date:___________                                                                                                         Time:__________  1.  I hereby authorizeSurgeon(s):  Aleksandr Rodrigues MD, my physician and his/her assistants (if applicable), which may include medical students, residents, and/or fellows, to perform the following surgical operation/ procedure and administer such anesthesia as may be determined necessary by my physician:  Operation/Procedure name (s) Procedure(s):  LAPAROSCOPIC APPENDECTOMY on Miguel Angel Zaragoza   2.   I recognize that during the surgical operation/procedure, unforeseen conditions may necessitate additional or different procedures than those listed above.  I, therefore, further authorize and request that the above-named surgeon, assistants, or designees perform such procedures as are, in their judgment, necessary and desirable.    3.   My surgeon/physician has discussed prior to my surgery the potential benefits, risks and side effects of this procedure; the likelihood of achieving goals; and potential problems that might occur during recuperation.  They also discussed reasonable alternatives to the procedure, including risks, benefits, and side effects related to the alternatives and risks related to not receiving this procedure.  I have had all my questions answered and I acknowledge that no guarantee has been made as to the result that may be obtained.    4.   Should the need arise during my operation/procedure, which includes change of level of care prior to discharge, I also consent to the administration of blood and/or blood products.  Further, I understand that despite careful testing and screening of blood or blood products by collecting agencies, I may still be subject to ill effects as a result of receiving a blood transfusion and/or blood products.  The  following are some, but not all, of the potential risks that can occur: fever and allergic reactions, hemolytic reactions, transmission of diseases such as Hepatitis, AIDS and Cytomegalovirus (CMV) and fluid overload.  In the event that I wish to have an autologous transfusion of my own blood, or a directed donor transfusion, I will discuss this with my physician.  Check only if Refusing Blood or Blood Products  I understand refusal of blood or blood products as deemed necessary by my physician may have serious consequences to my condition to include possible death. I hereby assume responsibility for my refusal and release the hospital, its personnel, and my physicians from any responsibility for the consequences of my refusal.         o  Refuse    5.   I authorize the use of any specimen, organs, tissues, body parts or foreign objects that may be removed from my body during the operation/procedure for diagnosis, research or teaching purposes and their subsequent disposal by hospital authorities.  I also authorize the release of specimen test results and/or written reports to my treating physician on the hospital medical staff or other referring or consulting physicians involved in my care, at the discretion of the Pathologist or my treating physician.    6.   I consent to the photographing or videotaping of the operations or procedures to be performed, including appropriate portions of my body for medical, scientific, or educational purposes, provided my identity is not revealed by the pictures or by descriptive texts accompanying them.  If the procedure has been photographed/videotaped, the surgeon will obtain the original picture, image, videotape or CD.  The hospital will not be responsible for storage, release or maintenance of the picture, image, tape or CD.    7.   I consent to the presence of a  or observers in the operating room as deemed necessary by my physician or their designees.    8.    I recognize that in the event my procedure results in extended X-Ray/fluoroscopy time, I may develop a skin reaction.    9. If I have a Do Not Attempt Resuscitation (DNAR) order in place, that status will be suspended while in the operating room, procedural suite, and during the recovery period unless otherwise explicitly stated by me (or a person authorized to consent on my behalf). The surgeon or my attending physician will determine when the applicable recovery period ends for purposes of reinstating the DNAR order.  10. Patients having a sterilization procedure: I understand that if the procedure is successful the results will be permanent and it will therefore be impossible for me to inseminate, conceive, or bear children.  I also understand that the procedure is intended to result in sterility, although the result has not been guaranteed.   11. I acknowledge that my physician has explained sedation/analgesia administration to me including the risk and benefits I consent to the administration of sedation/analgesia as may be necessary or desirable in the judgment of my physician.    I CERTIFY THAT I HAVE READ AND FULLY UNDERSTAND THE ABOVE CONSENT TO OPERATION and/or OTHER PROCEDURE.     _________________________________________  __________________________________  Signature of Patient     Signature of Responsible Person         ___________________________________         Printed Name of Responsible Person             _________________________________                 Relationship to Patient  _________________________________________  ______________________________  Signature of Witness          Date  Time      Patient Name: Miguel Angel Zaragoza     : 1955                 Printed: 2024     Medical Record #: [id number]                                            Page 2 of 3      [address]  [address]    Consent for Anesthesia    I, Miguel Angel Zaragoza agree to be  cared for by an anesthesiologist, who is specially trained to monitor me and give me medicine to put me to sleep or keep me comfortable during my procedure    I understand that my anesthesiologist is not an employee or agent of [facility] or [employer]. He or she works for [employer].    As the patient asking for anesthesia services, I agree to:  Allow the anesthesiologist (anesthesia doctor) to give me medicine and do additional procedures as necessary. Some examples are: Starting or using an “IV” to give me medicine, fluids or blood during my procedure, and having a breathing tube placed to help me breathe when I’m asleep (intubation). In the event that my heart stops working properly, I understand that my anesthesiologist will make every effort to sustain my life, unless otherwise directed by [facility] Do Not Resuscitate documents.  Tell my anesthesia doctor before my procedure:  If I am pregnant.  The last time that I ate or drank.  All of the medicines I take (including prescriptions, herbal supplements, and pills I can buy without a prescription (including street drugs/illegal medications). Failure to inform my anesthesiologist about these medicines may increase my risk of anesthetic complications.  If I am allergic to anything or have had a reaction to anesthesia before.  I understand how the anesthesia medicine will help me (benefits).  I understand that with any type of anesthesia medicine there are risks:  The most common risks are: nausea, vomiting, sore throat, muscle soreness, damage to my eyes, mouth, or teeth (from breathing tube placement).  Rare risks include: remembering what happened during my procedure, allergic reactions to medications, injury to my airway, heart, lungs, vision, nerves, or muscles and in extremely rare instances death.  My doctor has explained to me other choices available to me for my care (alternatives).  Pregnant Patients  (“epidural”):  I understand that the risks of having an epidural (medicine given into my back to help control pain during labor), include itching, low blood pressure, difficulty urinating, headache or slowing of the baby’s heart. Very rare risks include infection, bleeding, seizure, irregular heart rhythms and nerve injury.  Regional Anesthesia (“spinal”, “epidural”, & “nerve blocks”):  I understand that rare but potential complications include headache, bleeding, infection, seizure, irregular heart rhythms, and nerve injury.    I can change my mind about having anesthesia services at any time before I get the medicine.    _____________________________________________________________________________  Patient (or Representative) Signature/Relationship to Patient  Date   Time    _____________________________________________________________________________   Name (if used)    Language/Organization   Time    _____________________________________________________________________________  Anesthesiologist Signature     Date   Time  I have discussed the procedure and information above with the patient (or patient’s representative) and answered their questions. The patient or their representative has agreed to have anesthesia services.    _____________________________________________________________________________  Witness        Date   Time  I have verified that the signature is that of the patient or patient’s representative, and that it was signed before the procedure  Patient Name: Miguel Angel Zaragoza     : 1955                 Printed: 2024     Medical Record #: [id number]                     Page 3 of 3

## (undated) NOTE — LETTER
[address] [place] [address]      Authorization for Surgical Operation and Procedure     Date:___________                                                                                                         Time:_______ potential risks that can occur: fever and allergic reactions, hemolytic reactions, transmission of diseases such as Hepatitis, AIDS and Cytomegalovirus (CMV) and fluid overload.   In the event that I wish to have an autologous transfusion of my own blood, o attending physician will determine when the applicable recovery period ends for purposes of reinstating the DNAR order.   10. Patients having a sterilization procedure: I understand that if the procedure is successful the results will be permanent and it wi _______________________________________________________________ _____________________________  Sandhya Shoulders of Physician)                                                                                         (Date)                                   (Time

## (undated) NOTE — LETTER
25    Patient: Miguel Angel Zaragoza  : 1955 Visit date: 2025    Dear  Juan De La Torre MD    Thank you for referring Miguel Angel Zaragoza to my practice.  Please find my assessment and plan below.    Assessment   1. Internal hemorrhoids with complication        Plan     Successful treatment of internal hemorrhoid of the left lateral position by rubber band ligation.    The care plan is discussed with the patient who voiced understanding.    Dietary, activity, and exercise recommendations were discussed with the patient.    The patient is encouraged to avoid straining, continue dietary fiber supplementation, stool softeners, and adequate hydration.    Follow-up in 2 weeks for continued treatment of internal hemorrhoids.    The patient was provided ample opportunity to ask questions.  All of the patient's questions were answered in detail.  The patient voiced understanding of the care plan.    Assessment   1. Internal hemorrhoids with complication          Plan     The patient consented to-office rubber band ligation of internal hemorrhoid which was completed uneventfully.  Please see procedure note for details.    The wei-operative care plan was discussed with the patient, who voices understanding.  Activity and lifting recommendations were discussed in length.     The risks, benefits, and alternatives to the procedure were explained to the patient.  The risks explained include, but are not limited to, bleeding, infection, pain wound complications, recurrence, incorrect diagnosis, injury to adjacent organs and structures. We also discussed the possibile need for further therapeutic, diagnostic, or surgical intervention.  The patient voiced understanding, and after all questions were answered to the patient's satisfaction, the patient provided willing and informed consent to proceed.      Sincerely,       Aleksandr Rodrigues MD   CC: No Recipients